# Patient Record
Sex: FEMALE | Race: WHITE | NOT HISPANIC OR LATINO | Employment: OTHER | URBAN - METROPOLITAN AREA
[De-identification: names, ages, dates, MRNs, and addresses within clinical notes are randomized per-mention and may not be internally consistent; named-entity substitution may affect disease eponyms.]

---

## 2017-06-22 ENCOUNTER — HISTORICAL (OUTPATIENT)
Dept: RADIOLOGY | Facility: HOSPITAL | Age: 47
End: 2017-06-22

## 2017-10-11 LAB — POC BETA-HCG (QUAL): NEGATIVE

## 2017-10-31 ENCOUNTER — HISTORICAL (OUTPATIENT)
Dept: RADIOLOGY | Facility: HOSPITAL | Age: 47
End: 2017-10-31

## 2017-11-14 ENCOUNTER — HISTORICAL (OUTPATIENT)
Dept: ADMINISTRATIVE | Facility: HOSPITAL | Age: 47
End: 2017-11-14

## 2018-05-30 ENCOUNTER — HISTORICAL (OUTPATIENT)
Dept: ADMINISTRATIVE | Facility: HOSPITAL | Age: 48
End: 2018-05-30

## 2018-05-30 LAB
ABS NEUT (OLG): 3.18 X10(3)/MCL (ref 2.1–9.2)
BASOPHILS # BLD AUTO: 0.1 X10(3)/MCL (ref 0–0.2)
BASOPHILS NFR BLD AUTO: 1 %
BUN SERPL-MCNC: 20 MG/DL (ref 7–18)
CALCIUM SERPL-MCNC: 9.3 MG/DL (ref 8.5–10.1)
CHLORIDE SERPL-SCNC: 108 MMOL/L (ref 98–107)
CO2 SERPL-SCNC: 23 MMOL/L (ref 21–32)
CREAT SERPL-MCNC: 1.06 MG/DL (ref 0.55–1.02)
CREAT/UREA NIT SERPL: 18.9
EOSINOPHIL # BLD AUTO: 0.1 X10(3)/MCL (ref 0–0.9)
EOSINOPHIL NFR BLD AUTO: 2 %
ERYTHROCYTE [DISTWIDTH] IN BLOOD BY AUTOMATED COUNT: 14.9 % (ref 11.5–17)
GLUCOSE SERPL-MCNC: 136 MG/DL (ref 74–106)
HCT VFR BLD AUTO: 39.5 % (ref 37–47)
HGB BLD-MCNC: 12.3 GM/DL (ref 12–16)
LYMPHOCYTES # BLD AUTO: 1.5 X10(3)/MCL (ref 0.6–4.6)
LYMPHOCYTES NFR BLD AUTO: 28 %
MCH RBC QN AUTO: 28.1 PG (ref 27–31)
MCHC RBC AUTO-ENTMCNC: 31.1 GM/DL (ref 33–36)
MCV RBC AUTO: 90.4 FL (ref 80–94)
MONOCYTES # BLD AUTO: 0.4 X10(3)/MCL (ref 0.1–1.3)
MONOCYTES NFR BLD AUTO: 8 %
NEUTROPHILS # BLD AUTO: 3.18 X10(3)/MCL (ref 1.4–7.9)
NEUTROPHILS NFR BLD AUTO: 60 %
PLATELET # BLD AUTO: 277 X10(3)/MCL (ref 130–400)
PMV BLD AUTO: 11.4 FL (ref 9.4–12.4)
POTASSIUM SERPL-SCNC: 4.2 MMOL/L (ref 3.5–5.1)
RBC # BLD AUTO: 4.37 X10(6)/MCL (ref 4.2–5.4)
SODIUM SERPL-SCNC: 140 MMOL/L (ref 136–145)
WBC # SPEC AUTO: 5.3 X10(3)/MCL (ref 4.5–11.5)

## 2018-12-10 ENCOUNTER — HISTORICAL (OUTPATIENT)
Dept: ADMINISTRATIVE | Facility: HOSPITAL | Age: 48
End: 2018-12-10

## 2019-09-02 ENCOUNTER — HOSPITAL ENCOUNTER (OUTPATIENT)
Dept: MEDSURG UNIT | Facility: HOSPITAL | Age: 49
End: 2019-09-04
Attending: INTERNAL MEDICINE | Admitting: INTERNAL MEDICINE

## 2019-09-03 LAB
ABS NEUT (OLG): 5.85 X10(3)/MCL (ref 2.1–9.2)
ALBUMIN SERPL-MCNC: 3.7 GM/DL (ref 3.4–5)
ALBUMIN/GLOB SERPL: 1.1 {RATIO}
ALP SERPL-CCNC: 100 UNIT/L (ref 38–126)
ALT SERPL-CCNC: 22 UNIT/L (ref 12–78)
APPEARANCE, UA: CLEAR
AST SERPL-CCNC: 17 UNIT/L (ref 15–37)
BACTERIA SPEC CULT: ABNORMAL /HPF
BASOPHILS # BLD AUTO: 0.1 X10(3)/MCL (ref 0–0.2)
BASOPHILS NFR BLD AUTO: 1 %
BILIRUB SERPL-MCNC: 0.3 MG/DL (ref 0.2–1)
BILIRUB UR QL STRIP: NEGATIVE
BILIRUBIN DIRECT+TOT PNL SERPL-MCNC: 0.1 MG/DL (ref 0–0.2)
BILIRUBIN DIRECT+TOT PNL SERPL-MCNC: 0.2 MG/DL (ref 0–0.8)
BUN SERPL-MCNC: 15 MG/DL (ref 7–18)
CALCIUM SERPL-MCNC: 10 MG/DL (ref 8.5–10.1)
CHLORIDE SERPL-SCNC: 107 MMOL/L (ref 98–107)
CO2 SERPL-SCNC: 28 MMOL/L (ref 21–32)
COLOR UR: YELLOW
CREAT SERPL-MCNC: 1.41 MG/DL (ref 0.55–1.02)
EOSINOPHIL # BLD AUTO: 0.1 X10(3)/MCL (ref 0–0.9)
EOSINOPHIL NFR BLD AUTO: 1 %
ERYTHROCYTE [DISTWIDTH] IN BLOOD BY AUTOMATED COUNT: 12.6 % (ref 11.5–17)
GLOBULIN SER-MCNC: 3.4 GM/DL (ref 2.4–3.5)
GLUCOSE (UA): NEGATIVE
GLUCOSE SERPL-MCNC: 98 MG/DL (ref 74–106)
HCT VFR BLD AUTO: 41.9 % (ref 37–47)
HGB BLD-MCNC: 13.1 GM/DL (ref 12–16)
HGB UR QL STRIP: NEGATIVE
KETONES UR QL STRIP: NEGATIVE
LEUKOCYTE ESTERASE UR QL STRIP: NEGATIVE
LYMPHOCYTES # BLD AUTO: 1.6 X10(3)/MCL (ref 0.6–4.6)
LYMPHOCYTES NFR BLD AUTO: 19 %
MCH RBC QN AUTO: 29.7 PG (ref 27–31)
MCHC RBC AUTO-ENTMCNC: 31.3 GM/DL (ref 33–36)
MCV RBC AUTO: 95 FL (ref 80–94)
MONOCYTES # BLD AUTO: 0.8 X10(3)/MCL (ref 0.1–1.3)
MONOCYTES NFR BLD AUTO: 9 %
NEUTROPHILS # BLD AUTO: 5.85 X10(3)/MCL (ref 2.1–9.2)
NEUTROPHILS NFR BLD AUTO: 69 %
NITRITE UR QL STRIP: NEGATIVE
PH UR STRIP: 6.5 [PH] (ref 5–9)
PLATELET # BLD AUTO: 243 X10(3)/MCL (ref 130–400)
PMV BLD AUTO: 10.7 FL (ref 9.4–12.4)
POC TROPONIN: 0.01 NG/ML (ref 0–0.08)
POTASSIUM SERPL-SCNC: 4.1 MMOL/L (ref 3.5–5.1)
PROT SERPL-MCNC: 7.1 GM/DL (ref 6.4–8.2)
PROT UR QL STRIP: NEGATIVE
RBC # BLD AUTO: 4.41 X10(6)/MCL (ref 4.2–5.4)
RBC #/AREA URNS HPF: ABNORMAL /[HPF]
SODIUM SERPL-SCNC: 140 MMOL/L (ref 136–145)
SP GR UR STRIP: 1.03 (ref 1–1.03)
SQUAMOUS EPITHELIAL, UA: ABNORMAL
TROPONIN I SERPL-MCNC: 0.02 NG/ML (ref 0.02–0.49)
TROPONIN I SERPL-MCNC: <0.02 NG/ML (ref 0.02–0.49)
TROPONIN I SERPL-MCNC: <0.02 NG/ML (ref 0.02–0.49)
UROBILINOGEN UR STRIP-ACNC: 0.2
WBC # SPEC AUTO: 8.5 X10(3)/MCL (ref 4.5–11.5)
WBC #/AREA URNS HPF: ABNORMAL /[HPF]

## 2019-09-04 LAB
(HCYS)2 SERPL-MCNC: 5.7 MCMOL/L (ref 3.2–10.7)
ABS NEUT (OLG): 2.41 X10(3)/MCL (ref 2.1–9.2)
AT III ACT/NOR PPP CHRO: 95 % (ref 82–139)
BASOPHILS # BLD AUTO: 0.1 X10(3)/MCL (ref 0–0.2)
BASOPHILS NFR BLD AUTO: 1 %
BUN SERPL-MCNC: 14 MG/DL (ref 7–18)
CALCIUM SERPL-MCNC: 8.9 MG/DL (ref 8.5–10.1)
CHLORIDE SERPL-SCNC: 106 MMOL/L (ref 98–107)
CO2 SERPL-SCNC: 30 MMOL/L (ref 21–32)
CREAT SERPL-MCNC: 0.85 MG/DL (ref 0.55–1.02)
CREAT/UREA NIT SERPL: 16.5
EOSINOPHIL # BLD AUTO: 0.2 X10(3)/MCL (ref 0–0.9)
EOSINOPHIL NFR BLD AUTO: 4 %
ERYTHROCYTE [DISTWIDTH] IN BLOOD BY AUTOMATED COUNT: 12.6 % (ref 11.5–17)
GLUCOSE SERPL-MCNC: 81 MG/DL (ref 74–106)
HCT VFR BLD AUTO: 38.3 % (ref 37–47)
HGB BLD-MCNC: 12 GM/DL (ref 12–16)
LYMPHOCYTES # BLD AUTO: 2 X10(3)/MCL (ref 0.6–4.6)
LYMPHOCYTES NFR BLD AUTO: 37 %
MCH RBC QN AUTO: 29.6 PG (ref 27–31)
MCHC RBC AUTO-ENTMCNC: 31.3 GM/DL (ref 33–36)
MCV RBC AUTO: 94.6 FL (ref 80–94)
MONOCYTES # BLD AUTO: 0.7 X10(3)/MCL (ref 0.1–1.3)
MONOCYTES NFR BLD AUTO: 13 %
NEUTROPHILS # BLD AUTO: 2.41 X10(3)/MCL (ref 2.1–9.2)
NEUTROPHILS NFR BLD AUTO: 44 %
PLATELET # BLD AUTO: 233 X10(3)/MCL (ref 130–400)
PMV BLD AUTO: 10.7 FL (ref 9.4–12.4)
POTASSIUM SERPL-SCNC: 4.3 MMOL/L (ref 3.5–5.1)
RBC # BLD AUTO: 4.05 X10(6)/MCL (ref 4.2–5.4)
SODIUM SERPL-SCNC: 142 MMOL/L (ref 136–145)
WBC # SPEC AUTO: 5.5 X10(3)/MCL (ref 4.5–11.5)

## 2019-09-05 LAB — FINAL CULTURE: NO GROWTH

## 2019-10-10 ENCOUNTER — HISTORICAL (OUTPATIENT)
Dept: CARDIOLOGY | Facility: HOSPITAL | Age: 49
End: 2019-10-10

## 2020-07-29 ENCOUNTER — HISTORICAL (OUTPATIENT)
Dept: LAB | Facility: HOSPITAL | Age: 50
End: 2020-07-29

## 2020-07-29 LAB
ABS NEUT (OLG): 3.47 X10(3)/MCL (ref 2.1–9.2)
BASOPHILS # BLD AUTO: 0.06 X10(3)/MCL (ref 0–0.2)
BASOPHILS NFR BLD AUTO: 1 % (ref 0–1)
DEPRECATED CALCIDIOL+CALCIFEROL SERPL-MC: 67.7 NG/ML (ref 6.6–49.9)
EOSINOPHIL # BLD AUTO: 0.09 X10(3)/MCL (ref 0–0.9)
EOSINOPHIL NFR BLD AUTO: 1.6 % (ref 0–6.4)
ERYTHROCYTE [DISTWIDTH] IN BLOOD BY AUTOMATED COUNT: 12.5 % (ref 11.5–17)
HCT VFR BLD AUTO: 43.3 % (ref 37–47)
HGB BLD-MCNC: 14.3 GM/DL (ref 12–16)
IMM GRANULOCYTES # BLD AUTO: 0.01 10*3/UL (ref 0–0.02)
IMM GRANULOCYTES NFR BLD AUTO: 0.2 % (ref 0–0.43)
LYMPHOCYTES # BLD AUTO: 1.54 X10(3)/MCL (ref 0.6–4.6)
LYMPHOCYTES NFR BLD AUTO: 26.6 % (ref 16–44)
MCH RBC QN AUTO: 30.2 PG (ref 27–31)
MCHC RBC AUTO-ENTMCNC: 33 GM/DL (ref 33–36)
MCV RBC AUTO: 91.5 FL (ref 80–94)
MONOCYTES # BLD AUTO: 0.63 X10(3)/MCL (ref 0.1–1.3)
MONOCYTES NFR BLD AUTO: 10.9 % (ref 4–12.1)
NEUTROPHILS # BLD AUTO: 3.47 X10(3)/MCL (ref 2.1–9.2)
NEUTROPHILS NFR BLD AUTO: 59.7 % (ref 43–73)
NRBC BLD AUTO-RTO: 0 % (ref 0–0.2)
PLATELET # BLD AUTO: 273 X10(3)/MCL (ref 130–400)
PMV BLD AUTO: 10.6 FL (ref 7.4–10.4)
RBC # BLD AUTO: 4.73 X10(6)/MCL (ref 4.2–5.4)
WBC # SPEC AUTO: 5.8 X10(3)/MCL (ref 4.5–11.5)

## 2020-11-10 ENCOUNTER — HISTORICAL (OUTPATIENT)
Dept: RADIOLOGY | Facility: HOSPITAL | Age: 50
End: 2020-11-10

## 2021-03-15 ENCOUNTER — HISTORICAL (OUTPATIENT)
Dept: CARDIOLOGY | Facility: HOSPITAL | Age: 51
End: 2021-03-15

## 2022-04-10 ENCOUNTER — HISTORICAL (OUTPATIENT)
Dept: ADMINISTRATIVE | Facility: HOSPITAL | Age: 52
End: 2022-04-10
Payer: MEDICARE

## 2022-04-26 VITALS
OXYGEN SATURATION: 97 % | DIASTOLIC BLOOD PRESSURE: 78 MMHG | WEIGHT: 256 LBS | SYSTOLIC BLOOD PRESSURE: 120 MMHG | BODY MASS INDEX: 41.14 KG/M2 | HEIGHT: 66 IN

## 2022-05-18 ENCOUNTER — OFFICE VISIT (OUTPATIENT)
Dept: URGENT CARE | Facility: CLINIC | Age: 52
End: 2022-05-18
Payer: MEDICARE

## 2022-05-18 VITALS
OXYGEN SATURATION: 96 % | DIASTOLIC BLOOD PRESSURE: 84 MMHG | TEMPERATURE: 98 F | WEIGHT: 243 LBS | SYSTOLIC BLOOD PRESSURE: 129 MMHG | HEIGHT: 67 IN | RESPIRATION RATE: 18 BRPM | BODY MASS INDEX: 38.14 KG/M2 | HEART RATE: 64 BPM

## 2022-05-18 DIAGNOSIS — M25.572 ACUTE LEFT ANKLE PAIN: ICD-10-CM

## 2022-05-18 DIAGNOSIS — M25.532 LEFT WRIST PAIN: ICD-10-CM

## 2022-05-18 DIAGNOSIS — M25.512 ACUTE PAIN OF LEFT SHOULDER: ICD-10-CM
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
## 2022-05-18 PROCEDURE — 3074F SYST BP LT 130 MM HG: CPT | Mod: CPTII,,, | Performed by: FAMILY MEDICINE

## 2022-05-18 PROCEDURE — 99214 PR OFFICE/OUTPT VISIT, EST, LEVL IV, 30-39 MIN: ICD-10-PCS | Mod: ,,, | Performed by: FAMILY MEDICINE

## 2022-05-18 PROCEDURE — 3079F PR MOST RECENT DIASTOLIC BLOOD PRESSURE 80-89 MM HG: ICD-10-PCS | Mod: CPTII,,, | Performed by: FAMILY MEDICINE

## 2022-05-18 PROCEDURE — 3008F PR BODY MASS INDEX (BMI) DOCUMENTED: ICD-10-PCS | Mod: CPTII,,, | Performed by: FAMILY MEDICINE

## 2022-05-18 PROCEDURE — 1159F PR MEDICATION LIST DOCUMENTED IN MEDICAL RECORD: ICD-10-PCS | Mod: CPTII,,, | Performed by: FAMILY MEDICINE

## 2022-05-18 PROCEDURE — 1160F PR REVIEW ALL MEDS BY PRESCRIBER/CLIN PHARMACIST DOCUMENTED: ICD-10-PCS | Mod: CPTII,,, | Performed by: FAMILY MEDICINE

## 2022-05-18 PROCEDURE — 1159F MED LIST DOCD IN RCRD: CPT | Mod: CPTII,,, | Performed by: FAMILY MEDICINE

## 2022-05-18 PROCEDURE — 3008F BODY MASS INDEX DOCD: CPT | Mod: CPTII,,, | Performed by: FAMILY MEDICINE

## 2022-05-18 PROCEDURE — 3074F PR MOST RECENT SYSTOLIC BLOOD PRESSURE < 130 MM HG: ICD-10-PCS | Mod: CPTII,,, | Performed by: FAMILY MEDICINE

## 2022-05-18 PROCEDURE — 1160F RVW MEDS BY RX/DR IN RCRD: CPT | Mod: CPTII,,, | Performed by: FAMILY MEDICINE

## 2022-05-18 PROCEDURE — 3079F DIAST BP 80-89 MM HG: CPT | Mod: CPTII,,, | Performed by: FAMILY MEDICINE

## 2022-05-18 PROCEDURE — 99214 OFFICE O/P EST MOD 30 MIN: CPT | Mod: ,,, | Performed by: FAMILY MEDICINE

## 2022-05-18 RX ORDER — FAMOTIDINE 20 MG/1
20 TABLET, FILM COATED ORAL 2 TIMES DAILY
COMMUNITY
Start: 2022-04-04

## 2022-05-18 RX ORDER — OXCARBAZEPINE 300 MG/1
300 TABLET, FILM COATED ORAL 2 TIMES DAILY
COMMUNITY
Start: 2022-05-09 | End: 2024-04-01

## 2022-05-18 RX ORDER — CLONAZEPAM 1 MG/1
1 TABLET ORAL 3 TIMES DAILY
COMMUNITY
Start: 2022-05-09 | End: 2024-04-01

## 2022-05-18 RX ORDER — CELECOXIB 100 MG/1
200 CAPSULE ORAL 2 TIMES DAILY
COMMUNITY
Start: 2021-07-03 | End: 2024-04-01

## 2022-05-18 RX ORDER — ATORVASTATIN CALCIUM 40 MG/1
40 TABLET, FILM COATED ORAL DAILY
COMMUNITY
Start: 2022-03-11

## 2022-05-18 RX ORDER — TRAMADOL HYDROCHLORIDE 50 MG/1
50 TABLET ORAL EVERY 12 HOURS PRN
Qty: 10 EACH | Refills: 0 | Status: SHIPPED | OUTPATIENT
Start: 2022-05-18 | End: 2022-05-23

## 2022-05-18 RX ORDER — TRAZODONE HYDROCHLORIDE 100 MG/1
200 TABLET ORAL NIGHTLY PRN
COMMUNITY
Start: 2022-05-09 | End: 2024-04-01

## 2022-05-18 RX ORDER — PANTOPRAZOLE SODIUM 40 MG/1
40 TABLET, DELAYED RELEASE ORAL DAILY
COMMUNITY
Start: 2022-03-07

## 2022-05-18 RX ORDER — LANOLIN ALCOHOL/MO/W.PET/CERES
1 CREAM (GRAM) TOPICAL
COMMUNITY
End: 2022-09-18

## 2022-05-18 RX ORDER — LANOLIN ALCOHOL/MO/W.PET/CERES
1000 CREAM (GRAM) TOPICAL DAILY
COMMUNITY
End: 2024-04-01

## 2022-05-18 RX ORDER — SUVOREXANT 10 MG/1
10 TABLET, FILM COATED ORAL DAILY
COMMUNITY
Start: 2022-05-10

## 2022-05-18 RX ORDER — LEVOMEFOLATE CALCIUM 15 MG
TABLET ORAL
COMMUNITY
End: 2022-09-18

## 2022-05-18 RX ORDER — ACETAMINOPHEN, DIPHENHYDRAMINE HCL, PHENYLEPHRINE HCL 325; 25; 5 MG/1; MG/1; MG/1
10 TABLET ORAL DAILY
COMMUNITY

## 2022-05-18 RX ORDER — BREXPIPRAZOLE 1 MG/1
1 TABLET ORAL DAILY
COMMUNITY
Start: 2022-05-09 | End: 2024-04-01

## 2022-05-18 RX ORDER — RIVAROXABAN 10 MG/1
10 TABLET, FILM COATED ORAL DAILY
COMMUNITY
Start: 2022-02-23

## 2022-05-18 RX ORDER — VORTIOXETINE 20 MG/1
20 TABLET, FILM COATED ORAL NIGHTLY
COMMUNITY
Start: 2022-05-09

## 2022-05-18 NOTE — PATIENT INSTRUCTIONS
Ice to warm compress, Tylenol for discomfort. Given ER precautions for continued or worsening HA, NV, or other signs of worsening since she is on Xarelto there is a higher risk for brain bleed. Voiced understanding.   Tramadol for severe pain.  Can cause sedation.  Do not take and drive.

## 2022-05-18 NOTE — PROGRESS NOTES
"Subjective:       Patient ID: Mini Chappell is a 52 y.o. female.    Vitals:  height is 5' 7" (1.702 m) and weight is 110.2 kg (243 lb). Her oral temperature is 97.9 °F (36.6 °C). Her blood pressure is 129/84 and her pulse is 64. Her respiration is 18 and oxygen saturation is 96%.     Chief Complaint: Fall (Fell off of a golf cart on 5-18-22 left leg from hip to ankle, left wrist and elbow, possibly head)    Fell off of a golf cart on 5-18-22 left leg from hip to ankle, left wrist and elbow, possibly head    Fall  The accident occurred 1 to 3 hours ago. Fall occurred: from golf cart. She fell from a height of 3 to 5 ft. She landed on concrete. The point of impact was the head, neck, left elbow, left wrist, left hip, left knee, left foot and buttocks. The pain is present in the left foot, buttocks and left wrist. The pain is at a severity of 9/10. The pain is severe. The symptoms are aggravated by ambulation and pressure on injury. Associated symptoms comments: Numbness in face. She has tried nothing for the symptoms. The treatment provided no relief.     ROS    Objective:      Physical Exam      Assessment:       No diagnosis found.      Plan:         There are no diagnoses linked to this encounter.               "

## 2022-05-18 NOTE — PROGRESS NOTES
"Subjective:       Patient ID: Mini Chappell is a 52 y.o. female.    Vitals:  height is 5' 7" (1.702 m) and weight is 110.2 kg (243 lb). Her oral temperature is 97.9 °F (36.6 °C). Her blood pressure is 129/84 and her pulse is 64. Her respiration is 18 and oxygen saturation is 96%.     Chief Complaint: Fall (Fell off of a golf cart on 5-18-22 left leg from hip to ankle, left wrist and elbow, possibly head)    HPI 51 yo F with hx of PE on Xarelto presents for fall today onto L side with L wrist, L elbow, LLE along with hip and slight L sided head pain. No severe HA, no NV. No LOC. Able to ambulate with mild pain.  Fell onto concrete while the golf cart was turning.  No numbness or tingling.     Constitution: Negative for fatigue, fever and generalized weakness.   HENT: Negative for tinnitus, hearing loss and sinus pain.    Cardiovascular: Negative for chest trauma.   Musculoskeletal: Positive for pain and trauma.   Skin: Positive for abrasion.   Neurological: Negative for dizziness and altered mental status.   Psychiatric/Behavioral: Negative for altered mental status.       Objective:      Physical Exam   Constitutional: She is oriented to person, place, and time. She appears well-developed. She is cooperative. No distress.   HENT:   Head: Normocephalic and atraumatic.   Nose: Nose normal.   Mouth/Throat: Oropharynx is clear and moist and mucous membranes are normal.   Eyes: Conjunctivae and lids are normal.   Neck: Trachea normal and phonation normal. Neck supple.   Cardiovascular: Normal rate, regular rhythm, normal heart sounds and normal pulses.   Pulmonary/Chest: Effort normal and breath sounds normal.   Abdominal: Normal appearance and bowel sounds are normal. She exhibits no abdominal bruit, no pulsatile midline mass and no mass. Soft.   Musculoskeletal:         General: No deformity.      Comments: Pos TTP of lateral and posterior L shoulder, FROM of L shoulder with pulling sensation, neg empty can; FROM of " LUE and LLE; pos TTP of medial L wrist and medial malleolus L ankle otherwise generalized ache throughout thought to be from fibromyalgia.    Neurological: She is alert and oriented to person, place, and time. She has normal strength and normal reflexes. No sensory deficit.   Skin: Skin is warm, dry, intact and not diaphoretic.         Comments: Small abrasion to L shoulder; no other acute lesions appreciated   Psychiatric: Her speech is normal and behavior is normal. Judgment and thought content normal.   Nursing note and vitals reviewed.        Assessment:       1. Fall, initial encounter    2. Acute pain of left shoulder    3. Left wrist pain    4. Acute left ankle pain          Plan:         Fall, initial encounter  -     XR SHOULDER COMPLETE 2 OR MORE VIEWS LEFT; Future; Expected date: 05/18/2022  -     XR WRIST COMPLETE 3 VIEWS LEFT; Future; Expected date: 05/18/2022  -     XR ANKLE COMPLETE 3 VIEW LEFT; Future; Expected date: 05/18/2022  -     traMADoL (ULTRAM) 50 mg tablet; Take 1 tablet (50 mg total) by mouth every 12 (twelve) hours as needed for Pain.  Dispense: 10 each; Refill: 0    Acute pain of left shoulder  -     traMADoL (ULTRAM) 50 mg tablet; Take 1 tablet (50 mg total) by mouth every 12 (twelve) hours as needed for Pain.  Dispense: 10 each; Refill: 0    Left wrist pain  -     traMADoL (ULTRAM) 50 mg tablet; Take 1 tablet (50 mg total) by mouth every 12 (twelve) hours as needed for Pain.  Dispense: 10 each; Refill: 0    Acute left ankle pain  -     traMADoL (ULTRAM) 50 mg tablet; Take 1 tablet (50 mg total) by mouth every 12 (twelve) hours as needed for Pain.  Dispense: 10 each; Refill: 0            X ray of the left shoulder done today, per my review no acute fractures or dislocations.  X ray of the left wrist done today, per my review no acute fractures or dislocations.  X ray of the left ankle done today, per my review no acute fractures or dislocations.    Final reports came in and pt notified  of negative results for fracture or dislocations.

## 2022-05-19 ENCOUNTER — TELEPHONE (OUTPATIENT)
Dept: URGENT CARE | Facility: CLINIC | Age: 52
End: 2022-05-19
Payer: MEDICARE

## 2022-05-19 NOTE — TELEPHONE ENCOUNTER
Talked with pt about final xray results and pt verbalized understanding     Tried calling pt with xray results- no answer left vm

## 2022-09-18 ENCOUNTER — OFFICE VISIT (OUTPATIENT)
Dept: URGENT CARE | Facility: CLINIC | Age: 52
End: 2022-09-18
Payer: MEDICARE

## 2022-09-18 VITALS
RESPIRATION RATE: 19 BRPM | WEIGHT: 243 LBS | HEART RATE: 73 BPM | BODY MASS INDEX: 38.14 KG/M2 | HEIGHT: 67 IN | TEMPERATURE: 98 F | SYSTOLIC BLOOD PRESSURE: 98 MMHG | DIASTOLIC BLOOD PRESSURE: 64 MMHG | OXYGEN SATURATION: 98 %

## 2022-09-18 DIAGNOSIS — R05.9 COUGH: ICD-10-CM

## 2022-09-18 DIAGNOSIS — R06.00 DYSPNEA, UNSPECIFIED TYPE: Primary | ICD-10-CM

## 2022-09-18 LAB
CTP QC/QA: YES
CTP QC/QA: YES
FLUAV AG NPH QL: NEGATIVE
FLUBV AG NPH QL: NEGATIVE
SARS-COV-2 RDRP RESP QL NAA+PROBE: NEGATIVE

## 2022-09-18 PROCEDURE — 3074F PR MOST RECENT SYSTOLIC BLOOD PRESSURE < 130 MM HG: ICD-10-PCS | Mod: CPTII,,, | Performed by: FAMILY MEDICINE

## 2022-09-18 PROCEDURE — 3078F DIAST BP <80 MM HG: CPT | Mod: CPTII,,, | Performed by: FAMILY MEDICINE

## 2022-09-18 PROCEDURE — 1159F PR MEDICATION LIST DOCUMENTED IN MEDICAL RECORD: ICD-10-PCS | Mod: CPTII,,, | Performed by: FAMILY MEDICINE

## 2022-09-18 PROCEDURE — 3078F PR MOST RECENT DIASTOLIC BLOOD PRESSURE < 80 MM HG: ICD-10-PCS | Mod: CPTII,,, | Performed by: FAMILY MEDICINE

## 2022-09-18 PROCEDURE — 99213 OFFICE O/P EST LOW 20 MIN: CPT | Mod: 25,,, | Performed by: FAMILY MEDICINE

## 2022-09-18 PROCEDURE — 3008F PR BODY MASS INDEX (BMI) DOCUMENTED: ICD-10-PCS | Mod: CPTII,,, | Performed by: FAMILY MEDICINE

## 2022-09-18 PROCEDURE — 3074F SYST BP LT 130 MM HG: CPT | Mod: CPTII,,, | Performed by: FAMILY MEDICINE

## 2022-09-18 PROCEDURE — 87804 POCT INFLUENZA A/B: ICD-10-PCS | Mod: 59,QW,, | Performed by: FAMILY MEDICINE

## 2022-09-18 PROCEDURE — 1159F MED LIST DOCD IN RCRD: CPT | Mod: CPTII,,, | Performed by: FAMILY MEDICINE

## 2022-09-18 PROCEDURE — 1160F PR REVIEW ALL MEDS BY PRESCRIBER/CLIN PHARMACIST DOCUMENTED: ICD-10-PCS | Mod: CPTII,,, | Performed by: FAMILY MEDICINE

## 2022-09-18 PROCEDURE — 1160F RVW MEDS BY RX/DR IN RCRD: CPT | Mod: CPTII,,, | Performed by: FAMILY MEDICINE

## 2022-09-18 PROCEDURE — 99213 PR OFFICE/OUTPT VISIT, EST, LEVL III, 20-29 MIN: ICD-10-PCS | Mod: 25,,, | Performed by: FAMILY MEDICINE

## 2022-09-18 PROCEDURE — 3008F BODY MASS INDEX DOCD: CPT | Mod: CPTII,,, | Performed by: FAMILY MEDICINE

## 2022-09-18 PROCEDURE — 87804 INFLUENZA ASSAY W/OPTIC: CPT | Mod: QW,,, | Performed by: FAMILY MEDICINE

## 2022-09-18 PROCEDURE — U0002 COVID-19 LAB TEST NON-CDC: HCPCS | Mod: QW,,, | Performed by: FAMILY MEDICINE

## 2022-09-18 PROCEDURE — U0002: ICD-10-PCS | Mod: QW,,, | Performed by: FAMILY MEDICINE

## 2022-09-18 RX ORDER — TOLTERODINE TARTRATE 2 MG/1
4 TABLET, EXTENDED RELEASE ORAL DAILY
COMMUNITY
End: 2024-04-01

## 2022-09-18 RX ORDER — ALBUTEROL SULFATE 90 UG/1
2 AEROSOL, METERED RESPIRATORY (INHALATION) EVERY 6 HOURS PRN
COMMUNITY
End: 2023-01-30 | Stop reason: SDUPTHER

## 2022-09-18 RX ORDER — NYSTATIN 100000 U/G
CREAM TOPICAL
COMMUNITY

## 2022-09-18 RX ORDER — TRIAMCINOLONE ACETONIDE 1 MG/G
CREAM TOPICAL ONCE AS NEEDED
COMMUNITY

## 2022-09-18 RX ORDER — DESONIDE 0.5 MG/G
CREAM TOPICAL 2 TIMES DAILY
COMMUNITY
End: 2024-04-01

## 2022-09-18 RX ORDER — TIZANIDINE 4 MG/1
4 TABLET ORAL DAILY
COMMUNITY

## 2022-09-18 RX ORDER — IPRATROPIUM BROMIDE 42 UG/1
2 SPRAY, METERED NASAL 4 TIMES DAILY
COMMUNITY

## 2022-09-18 RX ORDER — LORATADINE 10 MG/1
10 TABLET ORAL DAILY
COMMUNITY
End: 2024-04-01

## 2022-09-18 RX ORDER — VALACYCLOVIR HYDROCHLORIDE 500 MG/1
500 TABLET, FILM COATED ORAL 2 TIMES DAILY
COMMUNITY

## 2022-09-18 RX ORDER — MUPIROCIN 20 MG/G
OINTMENT TOPICAL
COMMUNITY

## 2022-09-18 RX ORDER — LISDEXAMFETAMINE DIMESYLATE 70 MG/1
70 CAPSULE ORAL EVERY MORNING
COMMUNITY
End: 2024-04-01

## 2022-09-18 RX ORDER — ACETAMINOPHEN AND CODEINE PHOSPHATE 300; 30 MG/1; MG/1
1 TABLET ORAL EVERY 6 HOURS PRN
COMMUNITY
End: 2024-02-16 | Stop reason: SDUPTHER

## 2022-09-18 RX ORDER — LEVOMEFOLATE CALCIUM 15 MG
15 TABLET ORAL DAILY
COMMUNITY

## 2022-09-18 RX ORDER — CYCLOSPORINE 0.5 MG/ML
1 EMULSION OPHTHALMIC 2 TIMES DAILY
COMMUNITY

## 2022-09-18 NOTE — PROGRESS NOTES
"Subjective:       Patient ID: Mini hCappell is a 52 y.o. female.    Vitals:  height is 5' 7" (1.702 m) and weight is 110.2 kg (243 lb). Her temperature is 98.3 °F (36.8 °C). Her blood pressure is 98/64 and her pulse is 73. Her respiration is 19 and oxygen saturation is 98%.     Chief Complaint: Cough (Frequent bowel movements 1 week, cough, sweats, runny nose, dizziness, body ache since Thursday, diarrhea today)    HPI:  52-year-old female known for multiple medical conditions follows up with primary MD .  Present to clinic with concerns of frequent bowel movement since 1 week, patient also complains of coughing, congestion, body aches since 4 days.  No vomiting, no nausea.  No fever.  Denies eating anything different or unusual.  No bladder symptoms like burning sensation urgency or ear frequency.  No concerns of positive exposure to infections.  States not well since 1 week, most time in the bed for last 4 days.  Noticing short of breath and chest discomfort.  Appears concerned with history of PE in the past.  States 2 episodes of pulmonary embolism in the past.    ROS  :  Constitutional : Negative for fever, Negative for weakness  HEENT : No sore throat,No earache  Neck : Negative except HPI  Respiratory :  Occasional shortness of breath, chest discomfort  Cardiovascular : Negative for chest pain, no palpitations   Gastrointestinal : Negative except as documented in HPI  Integumentary : Negative for skin rash  Neurological : Negative except HPI   Objective:      Physical Exam    General : Alert and Oriented, No apparent distress, afebrile, clear speech, appropriate communication  Neck - supple  HENT : Oropharynx no redness or swelling.   Respiratory : Bilateral equal breath sounds, nonlabored respirations  Cardiovascular : Rate, rhythm regular, normal volume pulse, no murmur  Gastrointestinal: Full abdomen, soft, nontender to palpate  Integumentary : Warm, Dry and no rash    Assessment:       1. " Dyspnea, unspecified type    2. Cough       Plan:     With new onset dyspnea and chest discomfort, with history of pulmonary embolism in the past, encouraged to go to ER directly from the clinic for further evaluation.  Patient voiced understanding.  Accompanied by Mom.  Desires to go in personal vehicle to [facility].  COVID-19 test negative, flu test negative  Dyspnea, unspecified type    Cough  -     POCT COVID-19 Rapid Screening  -     POCT Influenza A/B

## 2022-09-18 NOTE — PATIENT INSTRUCTIONS
With new onset dyspnea and chest discomfort, with history of pulmonary embolism in the past, encouraged to go to ER directly from the clinic for further evaluation.  Patient voiced understanding.  Accompanied by Mom.  Desires to go in personal vehicle to heart occipital.  COVID-19 test negative, flu test negative

## 2022-09-21 ENCOUNTER — HISTORICAL (OUTPATIENT)
Dept: ADMINISTRATIVE | Facility: HOSPITAL | Age: 52
End: 2022-09-21
Payer: MEDICARE

## 2022-09-23 ENCOUNTER — OFFICE VISIT (OUTPATIENT)
Dept: URGENT CARE | Facility: CLINIC | Age: 52
End: 2022-09-23
Payer: MEDICARE

## 2022-09-23 VITALS
TEMPERATURE: 99 F | HEIGHT: 67 IN | RESPIRATION RATE: 20 BRPM | BODY MASS INDEX: 37.83 KG/M2 | OXYGEN SATURATION: 99 % | WEIGHT: 241 LBS | DIASTOLIC BLOOD PRESSURE: 77 MMHG | SYSTOLIC BLOOD PRESSURE: 111 MMHG | HEART RATE: 107 BPM

## 2022-09-23 DIAGNOSIS — L98.9 SKIN LESIONS, GENERALIZED: Primary | ICD-10-CM

## 2022-09-23 DIAGNOSIS — R42 VERTIGO: ICD-10-CM

## 2022-09-23 PROCEDURE — 3078F DIAST BP <80 MM HG: CPT | Mod: CPTII,,, | Performed by: FAMILY MEDICINE

## 2022-09-23 PROCEDURE — 1160F RVW MEDS BY RX/DR IN RCRD: CPT | Mod: CPTII,,, | Performed by: FAMILY MEDICINE

## 2022-09-23 PROCEDURE — 3008F BODY MASS INDEX DOCD: CPT | Mod: CPTII,,, | Performed by: FAMILY MEDICINE

## 2022-09-23 PROCEDURE — 1159F MED LIST DOCD IN RCRD: CPT | Mod: CPTII,,, | Performed by: FAMILY MEDICINE

## 2022-09-23 PROCEDURE — 3008F PR BODY MASS INDEX (BMI) DOCUMENTED: ICD-10-PCS | Mod: CPTII,,, | Performed by: FAMILY MEDICINE

## 2022-09-23 PROCEDURE — 3078F PR MOST RECENT DIASTOLIC BLOOD PRESSURE < 80 MM HG: ICD-10-PCS | Mod: CPTII,,, | Performed by: FAMILY MEDICINE

## 2022-09-23 PROCEDURE — 1159F PR MEDICATION LIST DOCUMENTED IN MEDICAL RECORD: ICD-10-PCS | Mod: CPTII,,, | Performed by: FAMILY MEDICINE

## 2022-09-23 PROCEDURE — 1160F PR REVIEW ALL MEDS BY PRESCRIBER/CLIN PHARMACIST DOCUMENTED: ICD-10-PCS | Mod: CPTII,,, | Performed by: FAMILY MEDICINE

## 2022-09-23 PROCEDURE — 3074F PR MOST RECENT SYSTOLIC BLOOD PRESSURE < 130 MM HG: ICD-10-PCS | Mod: CPTII,,, | Performed by: FAMILY MEDICINE

## 2022-09-23 PROCEDURE — 3074F SYST BP LT 130 MM HG: CPT | Mod: CPTII,,, | Performed by: FAMILY MEDICINE

## 2022-09-23 PROCEDURE — 99214 OFFICE O/P EST MOD 30 MIN: CPT | Mod: ,,, | Performed by: FAMILY MEDICINE

## 2022-09-23 PROCEDURE — 99214 PR OFFICE/OUTPT VISIT, EST, LEVL IV, 30-39 MIN: ICD-10-PCS | Mod: ,,, | Performed by: FAMILY MEDICINE

## 2022-09-23 RX ORDER — CEPHALEXIN 500 MG/1
500 CAPSULE ORAL EVERY 12 HOURS
Qty: 14 CAPSULE | Refills: 0 | Status: SHIPPED | OUTPATIENT
Start: 2022-09-23 | End: 2022-09-30

## 2022-09-23 NOTE — PATIENT INSTRUCTIONS
Cautious with lighting and walking support as well as good supportive shoes while getting further workup for vertigo.  Keflex to help with red lesions.

## 2022-09-23 NOTE — PROGRESS NOTES
"Subjective:       Patient ID: Mini Chappell is a 52 y.o. female.    Vitals:  height is 5' 7" (1.702 m) and weight is 109.3 kg (241 lb). Her oral temperature is 98.5 °F (36.9 °C). Her blood pressure is 111/77 and her pulse is 107. Her respiration is 20 and oxygen saturation is 99%.     Chief Complaint: Dizziness (Dizziness and knee pain for two weeks. Skin irritation for three weeks. Evaluated in clinic for same symptoms on 9/18/22. )    HPI  ROS    Objective:      Physical Exam      Assessment:       No diagnosis found.      Plan:         There are no diagnoses linked to this encounter.                 "

## 2022-09-23 NOTE — PROGRESS NOTES
"Subjective:       Patient ID: Mini Chappell is a 52 y.o. female.    Vitals:  height is 5' 7" (1.702 m) and weight is 109.3 kg (241 lb). Her oral temperature is 98.5 °F (36.9 °C). Her blood pressure is 111/77 and her pulse is 107. Her respiration is 20 and oxygen saturation is 99%.     Chief Complaint: Dizziness (Dizziness and knee pain for two weeks. Skin irritation for three weeks. Evaluated in clinic for same symptoms on 9/18/22. )    Pt seen twice for same symptoms.  EKG, CXR, examinations non concerning at that time.  Presents for dizziness that is being worked up for POTS by Cardiologist. Was planning to get workup by ENT but needed to stop many current medications.  Main concern today is lesions of B shoulders, back and BUE that she continues to itch at night time.  No fever.  Also upset about vertigo symptoms a falling over feeling when standing, worse with low lights.  Not sleeping well but on multiple sleep aids.     Constitution: Positive for sweating and fatigue. Negative for fever.   Cardiovascular:  Negative for chest pain and palpitations.   Eyes:  Negative for eye pain.   Genitourinary:  Negative for flank pain.   Skin:  Negative for rash.   Neurological:  Positive for dizziness and history of vertigo. Negative for passing out.     Objective:      Physical Exam   Constitutional: She is oriented to person, place, and time. No distress.   HENT:   Head: Normocephalic.   Nose: Nose normal.   Mouth/Throat: Mucous membranes are moist.   Cardiovascular: Normal rate and regular rhythm.   Pulmonary/Chest: Effort normal and breath sounds normal.   Neurological: She is alert and oriented to person, place, and time.      Comments: Neg pronator drift.    Skin: No bruising         Comments: Multiple erythematous excoriated lesions to the shoulder, arms and back.  Many with surrounding erythema.  jaundice  Psychiatric:      Comments: Crying, anxious   Nursing note and vitals reviewed.      Assessment:       1. Skin " lesions, generalized    2. Vertigo          Plan:         Skin lesions, generalized  -     cephALEXin (KEFLEX) 500 MG capsule; Take 1 capsule (500 mg total) by mouth every 12 (twelve) hours. for 7 days  Dispense: 14 capsule; Refill: 0    Vertigo

## 2023-01-17 ENCOUNTER — HOSPITAL ENCOUNTER (EMERGENCY)
Facility: HOSPITAL | Age: 53
Discharge: LEFT BEFORE DISCHARGE | End: 2023-01-17
Payer: MEDICARE

## 2023-01-17 VITALS
RESPIRATION RATE: 20 BRPM | SYSTOLIC BLOOD PRESSURE: 115 MMHG | TEMPERATURE: 98 F | HEART RATE: 79 BPM | OXYGEN SATURATION: 93 % | DIASTOLIC BLOOD PRESSURE: 81 MMHG

## 2023-01-17 DIAGNOSIS — R06.02 SOB (SHORTNESS OF BREATH): ICD-10-CM

## 2023-01-17 DIAGNOSIS — R06.02 SHORTNESS OF BREATH: ICD-10-CM

## 2023-01-17 LAB
ALBUMIN SERPL-MCNC: 3.7 G/DL (ref 3.5–5)
ALBUMIN/GLOB SERPL: 1.2 RATIO (ref 1.1–2)
ALP SERPL-CCNC: 76 UNIT/L (ref 40–150)
ALT SERPL-CCNC: 14 UNIT/L (ref 0–55)
APPEARANCE UR: ABNORMAL
AST SERPL-CCNC: 16 UNIT/L (ref 5–34)
BACTERIA #/AREA URNS AUTO: ABNORMAL /HPF
BASOPHILS # BLD AUTO: 0.06 X10(3)/MCL (ref 0–0.2)
BASOPHILS NFR BLD AUTO: 0.7 %
BILIRUB UR QL STRIP.AUTO: NEGATIVE MG/DL
BILIRUBIN DIRECT+TOT PNL SERPL-MCNC: 0.6 MG/DL
BUN SERPL-MCNC: 14.9 MG/DL (ref 9.8–20.1)
CALCIUM SERPL-MCNC: 9.5 MG/DL (ref 8.4–10.2)
CHLORIDE SERPL-SCNC: 107 MMOL/L (ref 98–107)
CO2 SERPL-SCNC: 23 MMOL/L (ref 22–29)
COLOR UR AUTO: ABNORMAL
CREAT SERPL-MCNC: 1.06 MG/DL (ref 0.55–1.02)
EOSINOPHIL # BLD AUTO: 0.01 X10(3)/MCL (ref 0–0.9)
EOSINOPHIL NFR BLD AUTO: 0.1 %
ERYTHROCYTE [DISTWIDTH] IN BLOOD BY AUTOMATED COUNT: 12.1 % (ref 11.5–17)
FLUAV AG UPPER RESP QL IA.RAPID: NOT DETECTED
FLUBV AG UPPER RESP QL IA.RAPID: NOT DETECTED
GFR SERPLBLD CREATININE-BSD FMLA CKD-EPI: >60 MLS/MIN/1.73/M2
GLOBULIN SER-MCNC: 3 GM/DL (ref 2.4–3.5)
GLUCOSE SERPL-MCNC: 124 MG/DL (ref 74–100)
GLUCOSE UR QL STRIP.AUTO: NEGATIVE MG/DL
HCT VFR BLD AUTO: 38.6 % (ref 37–47)
HGB BLD-MCNC: 12.4 GM/DL (ref 12–16)
IMM GRANULOCYTES # BLD AUTO: 0.03 X10(3)/MCL (ref 0–0.04)
IMM GRANULOCYTES NFR BLD AUTO: 0.3 %
KETONES UR QL STRIP.AUTO: ABNORMAL MG/DL
LEUKOCYTE ESTERASE UR QL STRIP.AUTO: ABNORMAL UNIT/L
LYMPHOCYTES # BLD AUTO: 0.74 X10(3)/MCL (ref 0.6–4.6)
LYMPHOCYTES NFR BLD AUTO: 8.3 %
MCH RBC QN AUTO: 30.2 PG
MCHC RBC AUTO-ENTMCNC: 32.1 MG/DL (ref 33–36)
MCV RBC AUTO: 93.9 FL (ref 80–94)
MONOCYTES # BLD AUTO: 0.74 X10(3)/MCL (ref 0.1–1.3)
MONOCYTES NFR BLD AUTO: 8.3 %
NEUTROPHILS # BLD AUTO: 7.37 X10(3)/MCL (ref 2.1–9.2)
NEUTROPHILS NFR BLD AUTO: 82.3 %
NITRITE UR QL STRIP.AUTO: NEGATIVE
NRBC BLD AUTO-RTO: 0 %
PH UR STRIP.AUTO: 5.5 [PH]
PLATELET # BLD AUTO: 242 X10(3)/MCL (ref 130–400)
PMV BLD AUTO: 9.9 FL (ref 7.4–10.4)
POTASSIUM SERPL-SCNC: 3.7 MMOL/L (ref 3.5–5.1)
PROT SERPL-MCNC: 6.7 GM/DL (ref 6.4–8.3)
PROT UR QL STRIP.AUTO: ABNORMAL MG/DL
RBC # BLD AUTO: 4.11 X10(6)/MCL (ref 4.2–5.4)
RBC #/AREA URNS AUTO: 25 /HPF
RBC UR QL AUTO: ABNORMAL UNIT/L
SARS-COV-2 RNA RESP QL NAA+PROBE: NOT DETECTED
SODIUM SERPL-SCNC: 138 MMOL/L (ref 136–145)
SP GR UR STRIP.AUTO: 1.02 (ref 1–1.03)
SQUAMOUS #/AREA URNS AUTO: 11 /HPF
TROPONIN I SERPL-MCNC: <0.01 NG/ML (ref 0–0.04)
UROBILINOGEN UR STRIP-ACNC: 0.2 MG/DL
WBC # SPEC AUTO: 9 X10(3)/MCL (ref 4.5–11.5)
WBC #/AREA URNS AUTO: 72 /HPF

## 2023-01-17 PROCEDURE — 81001 URINALYSIS AUTO W/SCOPE: CPT | Performed by: PHYSICIAN ASSISTANT

## 2023-01-17 PROCEDURE — 99285 EMERGENCY DEPT VISIT HI MDM: CPT | Mod: 25,CS

## 2023-01-17 PROCEDURE — 93010 ELECTROCARDIOGRAM REPORT: CPT | Mod: ,,, | Performed by: INTERNAL MEDICINE

## 2023-01-17 PROCEDURE — 0240U COVID/FLU A&B PCR: CPT | Performed by: PHYSICIAN ASSISTANT

## 2023-01-17 PROCEDURE — 93010 EKG 12-LEAD: ICD-10-PCS | Mod: ,,, | Performed by: INTERNAL MEDICINE

## 2023-01-17 PROCEDURE — 80053 COMPREHEN METABOLIC PANEL: CPT | Performed by: PHYSICIAN ASSISTANT

## 2023-01-17 PROCEDURE — 87088 URINE BACTERIA CULTURE: CPT | Performed by: PHYSICIAN ASSISTANT

## 2023-01-17 PROCEDURE — 93005 ELECTROCARDIOGRAM TRACING: CPT

## 2023-01-17 PROCEDURE — 84484 ASSAY OF TROPONIN QUANT: CPT | Performed by: PHYSICIAN ASSISTANT

## 2023-01-17 PROCEDURE — 85025 COMPLETE CBC W/AUTO DIFF WBC: CPT | Performed by: PHYSICIAN ASSISTANT

## 2023-01-17 NOTE — FIRST PROVIDER EVALUATION
"Medical screening examination initiated.  I have conducted a focused provider triage encounter, findings are as follows:    Chief Complaint   Patient presents with    Shortness of Breath     Pt to ER via POV for SOB and CP.  Started several hrs ago.  Hx PE 2018 takes xarelto. Pt reports "body pain", hx fibromyalgia       Brief history of present illness:  52 y.o. female presents to the E.D. with c/o abrupt shortness of breath. Patient has pmhx of PE, on xarelto. Last diagnosed PE was in 2018. Patient reports body pain all over.     Vitals:    01/17/23 1301   BP: 115/81   Pulse: 79   Resp: 20   Temp: 98.1 °F (36.7 °C)   TempSrc: Oral       Pertinent physical exam:  Awake, Alert, Oriented, Answers questions appropriately    Brief workup plan:  labs, ekg, imaging     Preliminary workup initiated; this workup will be continued and followed by the physician or advanced practice provider that is assigned to the patient when roomed.  "

## 2023-01-18 LAB
BACTERIA UR CULT: ABNORMAL

## 2023-01-27 ENCOUNTER — APPOINTMENT (OUTPATIENT)
Dept: LAB | Facility: HOSPITAL | Age: 53
End: 2023-01-27
Payer: MEDICARE

## 2023-02-22 ENCOUNTER — HOSPITAL ENCOUNTER (EMERGENCY)
Facility: HOSPITAL | Age: 53
Discharge: HOME | End: 2023-02-22
Attending: EMERGENCY MEDICINE
Payer: MEDICARE

## 2023-02-22 VITALS
BODY MASS INDEX: 39.24 KG/M2 | HEIGHT: 67 IN | DIASTOLIC BLOOD PRESSURE: 65 MMHG | WEIGHT: 250 LBS | HEART RATE: 84 BPM | RESPIRATION RATE: 18 BRPM | SYSTOLIC BLOOD PRESSURE: 111 MMHG | OXYGEN SATURATION: 98 % | TEMPERATURE: 98 F

## 2023-02-22 DIAGNOSIS — M51.36 DDD (DEGENERATIVE DISC DISEASE), LUMBAR: Primary | ICD-10-CM

## 2023-02-22 PROBLEM — G89.29 CHRONIC BILATERAL LOW BACK PAIN WITHOUT SCIATICA: Status: ACTIVE | Noted: 2019-03-07

## 2023-02-22 PROBLEM — F41.9 ANXIETY: Status: ACTIVE | Noted: 2023-02-22

## 2023-02-22 PROBLEM — M19.90 ARTHRITIS: Status: ACTIVE | Noted: 2023-02-22

## 2023-02-22 PROBLEM — K21.9 GASTROESOPHAGEAL REFLUX DISEASE: Status: ACTIVE | Noted: 2023-02-22

## 2023-02-22 PROBLEM — F32.A DEPRESSION: Status: ACTIVE | Noted: 2023-02-22

## 2023-02-22 PROBLEM — E78.5 HYPERLIPIDEMIA: Status: ACTIVE | Noted: 2023-02-22

## 2023-02-22 PROBLEM — F43.10 PTSD (POST-TRAUMATIC STRESS DISORDER): Status: ACTIVE | Noted: 2021-09-14

## 2023-02-22 PROBLEM — M54.50 CHRONIC BILATERAL LOW BACK PAIN WITHOUT SCIATICA: Status: ACTIVE | Noted: 2019-03-07

## 2023-02-22 PROBLEM — G43.909 MIGRAINE HEADACHE: Status: ACTIVE | Noted: 2023-02-22

## 2023-02-22 PROBLEM — M79.7 FIBROMYALGIA: Status: ACTIVE | Noted: 2017-12-15

## 2023-02-22 PROCEDURE — 25000003 PHARM REV CODE 250

## 2023-02-22 PROCEDURE — 99283 EMERGENCY DEPT VISIT LOW MDM: CPT

## 2023-02-22 RX ORDER — HYDROCODONE BITARTRATE AND ACETAMINOPHEN 7.5; 325 MG/1; MG/1
1 TABLET ORAL
Status: COMPLETED | OUTPATIENT
Start: 2023-02-22 | End: 2023-02-22

## 2023-02-22 RX ORDER — HYDROCODONE BITARTRATE AND ACETAMINOPHEN 7.5; 325 MG/1; MG/1
1 TABLET ORAL EVERY 6 HOURS PRN
Qty: 12 TABLET | Refills: 0 | Status: SHIPPED | OUTPATIENT
Start: 2023-02-22 | End: 2024-02-16 | Stop reason: SDUPTHER

## 2023-02-22 RX ADMIN — HYDROCODONE BITARTRATE AND ACETAMINOPHEN 1 TABLET: 7.5; 325 TABLET ORAL at 02:02

## 2023-02-22 NOTE — ED TRIAGE NOTES
Pt complaint of worsening pain to lower back especially at waking in the morning and bilateral knees pain with a jerking motion at walking. Pt PCP is Dr Sanchez relating injections to lower back approx a month ago with no improvement. Pt ambulatory with a cane

## 2023-02-22 NOTE — ED PROVIDER NOTES
Encounter Date: 2/22/2023       History     Chief Complaint   Patient presents with    Back Pain     Pt complaint of worsening pain to lower back especially at waking in the morning and bilateral knees pain with a jerking motion at walking. Pt PCP is Dr Sanchez relating injections to lower back approx a month ago with no improvement     The patient is a 53 y.o. female with a history of fibromyalgia who presents to the Emergency Department with a chief complaint of lower back pain. Symptoms began 1 month ago and have been constant since onset. Her pain is currently rated as a 8/10 in severity and described as aching with no radiation. Associated symptoms include bilateral knee pain. Symptoms are aggravated with movement and ambulation and there are no alleviating factors. The patient denies numbness or tingling to extremities, saddle anesthesia, loss of bowel or bladder. She reports taking nothing prior to arrival with no relief of symptoms. Patient states that she had injection in her back a few weeks ago with mild improvement on pain. No other reported symptoms at this time.      The history is provided by the patient. No  was used.   Back Pain   This is a new problem. The current episode started several days ago. The problem occurs daily. The problem has been waxing and waning. The pain is associated with no known injury. The pain is present in the lumbar spine. The quality of the pain is described as aching. The pain does not radiate. The pain is at a severity of 8/10. The pain is The same all the time. Pertinent negatives include no chest pain, no fever, no numbness, no headaches, no abdominal pain, no dysuria, no paresthesias, no paresis, no tingling and no weakness. She has tried nothing for the symptoms. The treatment provided no relief.   Review of patient's allergies indicates:   Allergen Reactions    Erythromycin base Diarrhea and Nausea And Vomiting     Stomach cramps    Macrolide  antibiotics Diarrhea and Rash     Other reaction(s): diarrhea/vomiting  Makes her sick   Makes her sick        Past Medical History:   Diagnosis Date    Anxiety     Bipolar affective     Chronic fatigue     Depression     Fibromyalgia     GERD (gastroesophageal reflux disease)     Migraines     Pulmonary embolism     Sleep apnea, unspecified     Unspecified osteoarthritis, unspecified site      Past Surgical History:   Procedure Laterality Date    DILATION AND CURETTAGE OF UTERUS      gastric sleeve      Heart monitor loop      HYSTERECTOMY      TUBAL LIGATION       Family History   Adopted: Yes     Social History     Tobacco Use    Smoking status: Former     Packs/day: 0.50     Years: 12.00     Pack years: 6.00     Types: Cigarettes     Quit date: 11/3/2020     Years since quittin.3    Smokeless tobacco: Never    Tobacco comments:     Patient smokes medical marijuana   Substance Use Topics    Alcohol use: Not Currently    Drug use: Yes     Types: Marijuana     Comment: medical     Review of Systems   Constitutional:  Negative for fever.   HENT:  Negative for sore throat.    Respiratory:  Negative for shortness of breath.    Cardiovascular:  Negative for chest pain.   Gastrointestinal:  Negative for abdominal pain, nausea and vomiting.   Genitourinary:  Negative for dysuria, frequency and urgency.   Musculoskeletal:  Positive for arthralgias and back pain.   Skin:  Negative for rash.   Neurological:  Negative for dizziness, tingling, weakness, numbness, headaches and paresthesias.   Hematological:  Does not bruise/bleed easily.   Psychiatric/Behavioral:  Negative for behavioral problems.    All other systems reviewed and are negative.    Physical Exam     Initial Vitals [23 1411]   BP Pulse Resp Temp SpO2   107/73 73 18 98 °F (36.7 °C) 97 %      MAP       --         Physical Exam    Nursing note and vitals reviewed.  Constitutional: She appears well-developed and well-nourished.   HENT:   Head:  Normocephalic.   Right Ear: Hearing and tympanic membrane normal.   Left Ear: Hearing and tympanic membrane normal.   Mouth/Throat: Uvula is midline, oropharynx is clear and moist and mucous membranes are normal.   Cardiovascular:  Normal rate, regular rhythm, normal heart sounds and normal pulses.           Pulmonary/Chest: Effort normal and breath sounds normal.   Abdominal: Abdomen is soft. Bowel sounds are normal. There is no abdominal tenderness.   Musculoskeletal:      Cervical back: Normal.      Thoracic back: Normal.      Lumbar back: Tenderness present. Normal range of motion.      Right knee: Tenderness present. Normal pulse.      Left knee: Tenderness present. Normal pulse.     Lymphadenopathy:     She has no cervical adenopathy.   Neurological: She is alert. GCS eye subscore is 4. GCS verbal subscore is 5. GCS motor subscore is 6.   Skin: Skin is warm, dry and intact. Capillary refill takes less than 2 seconds.       ED Course   Procedures  Labs Reviewed - No data to display       Imaging Results              X-Ray Lumbar Spine 2 Or 3 Views (Final result)  Result time 02/22/23 15:00:35      Final result by Christine Gipson MD (02/22/23 15:00:35)                   Impression:      1. No acute abnormality identified.  2. Multilevel degenerative changes of the lumbar spine, progressed from the prior exam.      Electronically signed by: Christine Gipson  Date:    02/22/2023  Time:    15:00               Narrative:    EXAMINATION:  XR LUMBAR SPINE 2 OR 3 VIEWS    CLINICAL HISTORY:  lower back pain;    COMPARISON:  X-rays dated 06/05/2015    FINDINGS:  There are 5 non-rib-bearing lumbar type vertebral bodies.  There is grade 1 anterolisthesis of L4 over L5.  Alignment is otherwise preserved.  Vertebral heights are maintained.  There is mild disc height loss at L1-L2 with small marginal osteophytes.  There is moderate facet arthropathy in the lower lumbar spine.  Minimal scattered vascular calcifications are  noted.                                       Medications   HYDROcodone-acetaminophen 7.5-325 mg per tablet 1 tablet (1 tablet Oral Given 2/22/23 6868)     Medical Decision Making:   Initial Assessment:   The patient is a 53 y.o. female with a history of fibromyalgia who presents to the Emergency Department with a chief complaint of lower back pain. Symptoms began 1 month ago and have been constant since onset. Her pain is currently rated as a 8/10 in severity and described as aching with no radiation. Associated symptoms include bilateral knee pain. Symptoms are aggravated with movement and ambulation and there are no alleviating factors. The patient denies numbness or tingling to extremities, saddle anesthesia, loss of bowel or bladder. She reports taking nothing prior to arrival with no relief of symptoms. Patient states that she had injection in her back a few weeks ago with mild improvement on pain. No other reported symptoms at this time.  Differential Diagnosis:   DDD, lumbar strain, lumbar radiculopathy   Clinical Tests:   Radiological Study: Ordered and Reviewed  ED Management:  MDM  History obtained by patient  Co-morbidities and/or factors adding to the complexity or risk for the patient?: chronic pain  Differential diagnoses: DDD, lumbar strain, lumbar radiculopathy   Decision to obtain previous or outside records?: no  Chart Review (nursing home, outside records, CareEverywhere): no  Review of RX medications/new RX prescribed by me?: norco  My EKG Interpretation: see above  Labs/imaging/other tests obtained/considered (risk/benefits of testing discussed): xr lumbar spine  Labs/tests intepretation: multilevel degenerative changes of lumbar spine  My independent imaging interpretation: none  Treatment/interventions, IV fluids, IV medications: norco  Complex management (IV controlled substances, went to OR, DNR, meds requiring monitoring, transfer, etc)?: none  Workup/treatment affected by social  determinants of health?: none   Point of care US done/interpretation: none  Consults/radiologist/EMS/social work/family discussion/alternate history: none  Advanced care planning/end of life discussion: none  Shared decision making: shared decision making with patient.  ETOH/smoking/drug cessation discussion: discussed with patient.   Dispo: discharge home            ED Course as of 02/22/23 1532   Wed Feb 22, 2023   1515 Patient reports significant improvement in her pain after norco. She is moving all extremities. She has no numbness or tingling to extremities. She has no red flag neurological symptoms. Discussed results with patient and encouraged her to follow up with her pcp. She is amendable to plan and ready for dc home.  [LM]      ED Course User Index  [LM] Michelle Erwin NP                 Clinical Impression:   Final diagnoses:  [M51.36] DDD (degenerative disc disease), lumbar (Primary)        ED Disposition Condition    Discharge Stable          ED Prescriptions       Medication Sig Dispense Start Date End Date Auth. Provider    HYDROcodone-acetaminophen (NORCO) 7.5-325 mg per tablet Take 1 tablet by mouth every 6 (six) hours as needed for Pain. 12 tablet 2/22/2023 -- Michelle Erwin NP          Follow-up Information       Follow up With Specialties Details Why Contact Info    Guadalupe Ta MD Family Medicine Schedule an appointment as soon as possible for a visit  As needed, If symptoms worsen [id number] [facility] [address]  [phone]               Michelle Erwin NP  02/22/23 3726

## 2023-03-06 DIAGNOSIS — M54.42 CHRONIC LOW BACK PAIN WITH BILATERAL SCIATICA, UNSPECIFIED BACK PAIN LATERALITY: Primary | ICD-10-CM

## 2023-03-06 DIAGNOSIS — G89.29 CHRONIC LOW BACK PAIN WITH BILATERAL SCIATICA, UNSPECIFIED BACK PAIN LATERALITY: Primary | ICD-10-CM

## 2023-03-06 DIAGNOSIS — M54.41 CHRONIC LOW BACK PAIN WITH BILATERAL SCIATICA, UNSPECIFIED BACK PAIN LATERALITY: Primary | ICD-10-CM

## 2023-03-13 ENCOUNTER — TELEPHONE (OUTPATIENT)
Dept: NEUROSURGERY | Facility: CLINIC | Age: 53
End: 2023-03-13
Payer: MEDICARE

## 2023-03-13 NOTE — TELEPHONE ENCOUNTER
Received referral. Spoke with patient. She states she does have an MRI of her back scheduled for 3/17/23 at [facility]. I tentatively scheduled her with Dr. Sanchez for  5/9/23 at 1:00. Patient has not had any recent testing since Xray in 2/2023. Denies seeing any other doctors for this. Has not had any surgeries on her back/neck. Denies any conservative treatment at this time. I did advise her that I will send myself a reminder to F/U on MRI results and will be in touch with her. She verbalized understanding.

## 2023-03-20 NOTE — TELEPHONE ENCOUNTER
Pulled MRI report/images. I do not see anything alarming on the MRI report that patient should come in earlier, will keep the apt with Dr. Sanchez as is.

## 2023-04-03 ENCOUNTER — HOSPITAL ENCOUNTER (OUTPATIENT)
Dept: RADIOLOGY | Facility: HOSPITAL | Age: 53
Discharge: HOME | End: 2023-04-03
Payer: MEDICARE

## 2023-04-03 DIAGNOSIS — Z86.711 HX OF PULMONARY EMBOLUS: ICD-10-CM

## 2023-04-03 DIAGNOSIS — J18.9 PNEUMONIA DUE TO INFECTIOUS ORGANISM, UNSPECIFIED LATERALITY, UNSPECIFIED PART OF LUNG: ICD-10-CM

## 2023-04-03 PROCEDURE — 25500020 PHARM REV CODE 255

## 2023-04-03 PROCEDURE — 71260 CT THORAX DX C+: CPT | Mod: TC

## 2023-04-03 RX ADMIN — IOPAMIDOL 100 ML: 755 INJECTION, SOLUTION INTRAVENOUS at 09:04

## 2023-05-25 NOTE — TELEPHONE ENCOUNTER
The patients new patient appointment was canceled due to Dr. Sanchez having a lot of hospital call and traumas.  Since her appointment we have changed some of our scheduling guidelines.  Dr. Sanchez is not accepting any new spine patients with Medicare or Medicare advantage under the age of 65.  I spoke to Zuly with Dr. Ta's office to make her aware and they will refer her elsewhere.  BH

## 2023-09-11 ENCOUNTER — HOSPITAL ENCOUNTER (OUTPATIENT)
Dept: RADIOLOGY | Facility: CLINIC | Age: 53
Discharge: HOME | End: 2023-09-11
Attending: REHABILITATION UNIT
Payer: MEDICARE

## 2023-09-11 ENCOUNTER — OFFICE VISIT (OUTPATIENT)
Dept: ORTHOPEDICS | Facility: CLINIC | Age: 53
End: 2023-09-11
Payer: MEDICARE

## 2023-09-11 DIAGNOSIS — M54.50 CHRONIC BILATERAL LOW BACK PAIN WITHOUT SCIATICA: ICD-10-CM

## 2023-09-11 DIAGNOSIS — M25.512 BILATERAL SHOULDER PAIN, UNSPECIFIED CHRONICITY: ICD-10-CM

## 2023-09-11 DIAGNOSIS — M25.512 BILATERAL SHOULDER PAIN, UNSPECIFIED CHRONICITY: Primary | ICD-10-CM

## 2023-09-11 DIAGNOSIS — M25.511 BILATERAL SHOULDER PAIN, UNSPECIFIED CHRONICITY: Primary | ICD-10-CM

## 2023-09-11 DIAGNOSIS — G89.29 CHRONIC BILATERAL LOW BACK PAIN WITHOUT SCIATICA: ICD-10-CM

## 2023-09-11 DIAGNOSIS — M75.80 ROTATOR CUFF TENDINITIS, UNSPECIFIED LATERALITY: ICD-10-CM

## 2023-09-11 DIAGNOSIS — M25.511 BILATERAL SHOULDER PAIN, UNSPECIFIED CHRONICITY: ICD-10-CM

## 2023-09-11 PROCEDURE — 73030 X-RAY EXAM OF SHOULDER: CPT | Mod: RT,,, | Performed by: REHABILITATION UNIT

## 2023-09-11 PROCEDURE — 20610 LARGE JOINT ASPIRATION/INJECTION: BILATERAL SUBACROMIAL BURSA: ICD-10-PCS | Mod: 50,,, | Performed by: REHABILITATION UNIT

## 2023-09-11 PROCEDURE — 73030 XR SHOULDER COMPLETE 2 OR MORE VIEWS RIGHT: ICD-10-PCS | Mod: RT,,, | Performed by: REHABILITATION UNIT

## 2023-09-11 PROCEDURE — 99203 OFFICE O/P NEW LOW 30 MIN: CPT | Mod: 25,,, | Performed by: REHABILITATION UNIT

## 2023-09-11 PROCEDURE — 73030 X-RAY EXAM OF SHOULDER: CPT | Mod: LT,,, | Performed by: REHABILITATION UNIT

## 2023-09-11 PROCEDURE — 99203 PR OFFICE/OUTPT VISIT, NEW, LEVL III, 30-44 MIN: ICD-10-PCS | Mod: 25,,, | Performed by: REHABILITATION UNIT

## 2023-09-11 PROCEDURE — 20610 DRAIN/INJ JOINT/BURSA W/O US: CPT | Mod: 50,,, | Performed by: REHABILITATION UNIT

## 2023-09-11 RX ADMIN — BETAMETHASONE SODIUM PHOSPHATE AND BETAMETHASONE ACETATE 6 MG: 3; 3 INJECTION, SUSPENSION INTRA-ARTICULAR; INTRALESIONAL; INTRAMUSCULAR; SOFT TISSUE at 01:09

## 2023-09-11 RX ADMIN — LIDOCAINE HYDROCHLORIDE 60 MG: 20 INJECTION, SOLUTION INFILTRATION; PERINEURAL at 01:09

## 2023-09-11 NOTE — PROGRESS NOTES
Subjective:      Patient ID: Mini Chappell is a 53 y.o. female.    Chief Complaint: Shoulder Pain (RHD. patient is having pain FREMIN sholders. Rt. is worse than Lt. ongoing a few months ago. saw. Dr. Escalera at [facility] before over a year and had injection and it worked well. some injections work and some dont from other Dr.)    HPI:   Mini Chappell is a 53 y.o. female who presents today for initial evaluation of her bilateral shoulders. RHD. R>L.  Longstanding bilateral shoulder pain.  It is associated with loss of motion and strength.  She has seen another provider and was given steroid injection.  She reports that this was very helpful at providing some good but temporary relief.  Ultimately her pain returned.  She has known carpal tunnel syndrome to her bilateral upper extremities.  She has pain that radiates diffusely about the shoulder.  Her pain is maximally to the posterior aspect of her right shoulder.  She does have some occasional pain extends distally into the upper arm, elbow, and forearm.  She has tried physical therapy in the past.  She reports that therapy was not beneficial.  She has tried several medications with no relief.    Past Medical History:   Diagnosis Date    Anxiety     Bipolar affective     Chronic fatigue     Depression     Fibromyalgia     GERD (gastroesophageal reflux disease)     Migraines     Pulmonary embolism     Sleep apnea, unspecified     Unspecified osteoarthritis, unspecified site      Past Surgical History:   Procedure Laterality Date    DILATION AND CURETTAGE OF UTERUS      gastric sleeve      Heart monitor loop      HYSTERECTOMY      TUBAL LIGATION       Social History     Socioeconomic History    Marital status:    Tobacco Use    Smoking status: Former     Current packs/day: 0.00     Average packs/day: 0.5 packs/day for 12.0 years (6.0 ttl pk-yrs)     Types: Cigarettes     Start date: 11/3/2008     Quit date: 11/3/2020     Years since quittin.8    Smokeless  tobacco: Never    Tobacco comments:     Patient smokes medical marijuana   Substance and Sexual Activity    Alcohol use: Not Currently    Drug use: Yes     Types: Marijuana     Comment: medical         Current Outpatient Medications:     acetaminophen-codeine 300-30mg (TYLENOL #3) 300-30 mg Tab, Take 1 tablet by mouth every 6 (six) hours as needed., Disp: , Rfl:     albuterol (PROVENTIL/VENTOLIN HFA) 90 mcg/actuation inhaler, Inhale 2 puffs into the lungs every 6 (six) hours as needed for Wheezing. Rescue, Disp: 18 g, Rfl: 11    albuterol (PROVENTIL/VENTOLIN HFA) 90 mcg/actuation inhaler, Inhale into the lungs., Disp: , Rfl:     atorvastatin (LIPITOR) 40 MG tablet, Take 40 mg by mouth once daily., Disp: , Rfl:     BELSOMRA 10 mg Tab, Take 10 mg by mouth Daily., Disp: , Rfl:     calcium citrate/vitamin D3 (CITRACAL + D ORAL), Take 630 mg by mouth 2 (two) times a day., Disp: , Rfl:     celecoxib (CELEBREX) 100 MG capsule, Take 200 mg by mouth 2 (two) times daily., Disp: , Rfl:     cetirizine (ZYRTEC) 10 mg Cap, Take by mouth., Disp: , Rfl:     clonazePAM (KLONOPIN) 1 MG tablet, Take 1 mg by mouth 3 (three) times daily., Disp: , Rfl:     COQ10, UBIQUINOL, ORAL, Take 100 mg by mouth Daily., Disp: , Rfl:     cyanocobalamin (VITAMIN B-12) 1000 MCG tablet, Take 1,000 mcg by mouth once daily., Disp: , Rfl:     cycloSPORINE (RESTASIS) 0.05 % ophthalmic emulsion, 1 drop 2 (two) times daily., Disp: , Rfl:     desonide (DESOWEN) 0.05 % cream, Apply topically 2 (two) times daily., Disp: , Rfl:     famotidine (PEPCID) 20 MG tablet, Take 20 mg by mouth 2 (two) times daily., Disp: , Rfl:     guaifenesin/dextromethorphan (ROBITUSSIN-COUGH-CHEST-TIERNEY ORAL), Take by mouth., Disp: , Rfl:     HYDROcodone-acetaminophen (NORCO) 7.5-325 mg per tablet, Take 1 tablet by mouth every 6 (six) hours as needed for Pain., Disp: 12 tablet, Rfl: 0    ipratropium (ATROVENT) 42 mcg (0.06 %) nasal spray, 2 sprays by Nasal route 4 (four) times daily.,  Disp: , Rfl:     levomefolate calcium (ELFOLATE) 15 mg Tab, Take 15 mg by mouth Daily., Disp: , Rfl:     lisdexamfetamine (VYVANSE) 70 MG capsule, Take 70 mg by mouth every morning., Disp: , Rfl:     loratadine (CLARITIN) 10 mg tablet, Take 10 mg by mouth once daily., Disp: , Rfl:     melatonin 10 mg Tab, Take 10 mg by mouth Daily., Disp: , Rfl:     mupirocin (BACTROBAN) 2 % ointment, Apply topically., Disp: , Rfl:     mv,calcium,min/iron/folic/vitK (ONE-A-DAY WOMEN'S COMPLETE ORAL), Take by mouth Daily., Disp: , Rfl:     nystatin (MYCOSTATIN) cream, Apply topically., Disp: , Rfl:     OXcarbazepine (TRILEPTAL) 300 MG Tab, Take 300 mg by mouth 2 (two) times daily., Disp: , Rfl:     pantoprazole (PROTONIX) 40 MG tablet, Take 40 mg by mouth once daily., Disp: , Rfl:     REXULTI 1 mg Tab, Take 1 mg by mouth Daily., Disp: , Rfl:     tiZANidine (ZANAFLEX) 4 MG tablet, Take 4 mg by mouth Daily., Disp: , Rfl:     tolterodine (DETROL) 2 MG Tab, Take 4 mg by mouth Daily., Disp: , Rfl:     traZODone (DESYREL) 100 MG tablet, Take 200 mg by mouth nightly as needed., Disp: , Rfl:     triamcinolone acetonide 0.1% (KENALOG) 0.1 % cream, Apply topically once as needed., Disp: , Rfl:     TRINTELLIX 20 mg Tab, Take 20 mg by mouth nightly., Disp: , Rfl:     UNABLE TO FIND, Inhale into the lungs every 6 (six) hours. medication name: Prescription Cannabis, Disp: , Rfl:     UNABLE TO FIND, medication name: [place] Vitamin D3 1 capsule 50,000intl units oral daily, Disp: , Rfl:     valACYclovir (VALTREX) 500 MG tablet, Take 500 mg by mouth 2 (two) times daily. 1 tab po bid for 3 days prn, Disp: , Rfl:     XARELTO 10 mg Tab, Take 10 mg by mouth Daily., Disp: , Rfl:   Review of patient's allergies indicates:   Allergen Reactions    Erythromycin base Diarrhea and Nausea And Vomiting     Stomach cramps    Macrolide antibiotics Diarrhea and Rash     Other reaction(s): diarrhea/vomiting  Makes her sick   Makes her sick          There were no  vitals taken for this visit.    Comprehensive review of systems completed and negative except as per HPI.        Objective:   Head: Normocephalic, without obvious abnormality, atraumatic  Eyes: conjunctivae/corneas clear. EOM's intact  Ears: normal external appearance  Nose: Nares normal. Septum midline. Mucosa normal. No drainage  Throat: normal findings: lips normal without lesions  Lungs: unlabored breathing on room air  Chest wall: symmetric chest rise  Heart: regular rate and rhythm  Pulses: 2+ and symmetric  Skin: Skin color, texture, turgor normal. No rashes or lesions  Neurologic: Grossly normal    bilateral SHOULDER    Appearance:   normal    Cervical Spine:   No ttp; full, painless ROM; negative Spurlings    Tenderness:     Diffusely about the right shoulder with no significant discrete tenderness to the left    AROM:   R , Abduction 150, ER 80, IR side pocket  L , Abduction 160, ER 80, IR T10    PROM:  same    Pain:  AROM: Positive  PROM:  Positive  End ROM: Positive  Supraspinatus strength testing: Positive  External rotation strength testing: Positive  Loraine-scapular: Positive  Virtually all provacative maneuvers Positive    Strength:  Supraspinatus: intact  External rotation: intact  Loraine-scapular: intact    Provocative Maneuvers:     Rotator Cuff/Biceps/AC Joint  Neer's Sign: Positive  Hawkin's Test: Positive  Painful arc: Positive  Belly Press: Negative  Bear Hugger Test: Negative  Hornblower's Sign: Negative  Speed's Test: Positive  Yergeson's Test: Positive  Cross Arm Abduction: Positive    Pulses: Palpable radial pulse    Neurological deficits: None    The patient has a warm and well-perfused upper extremity with capillary refill less than 2 seconds. Sensation is intact to light touch in terminal nerve distributions. 5/5 ain/pin/uln. The patient has no palpable epitrochlear lymphadenopathy.      Assessment:     Imaging:   Four view radiographs of the right shoulder obtained today  personally reviewed showing no acute fractures or dislocations.  Joint spaces are well maintained. Moderate AC arthritis.  Humeral head remains seated centrally within the glenoid.    Four view radiographs of the left shoulder obtained today personally reviewed showing no acute fractures or dislocations.  Joint spaces are well maintained. Moderate AC arthritis. Humeral head remains seated centrally within the glenoid.    Large Joint Aspiration/Injection: bilateral subacromial bursa    Date/Time: 9/11/2023 1:00 PM    Performed by: Emmanuel Escalera MD  Authorized by: Emmanuel Escalera MD    Consent Done?:  Yes (Verbal)  Indications:  Pain  Site marked: the procedure site was marked    Timeout: prior to procedure the correct patient, procedure, and site was verified    Prep: patient was prepped and draped in usual sterile fashion    Local anesthesia used?: No      Details:  Needle Size:  22 G  Ultrasonic Guidance for needle placement?: No    Approach:  Posterior  Location:  Shoulder  Laterality:  Bilateral  Site:  Bilateral subacromial bursa  Medications (Right):  60 mg LIDOcaine HCL 20 mg/ml (2%) 20 mg/mL (2 %); 6 mg betamethasone acetate-betamethasone sodium phosphate 6 mg/mL  Medications (Left):  60 mg LIDOcaine HCL 20 mg/ml (2%) 20 mg/mL (2 %); 6 mg betamethasone acetate-betamethasone sodium phosphate 6 mg/mL  Patient tolerance:  Patient tolerated the procedure well with no immediate complications          1. Bilateral shoulder pain, unspecified chronicity    2. Rotator cuff tendinitis, unspecified laterality          Plan:       Orders Placed This Encounter    X-ray Shoulder 2 or More Views Right    X-Ray Shoulder 2 or More Views Left     Imaging and exam findings discussed with the patient.  She has bilateral shoulder pain.  She has good motion and strength overall with no significant bony abnormality on plain radiographs outside of some AC arthritis.  Findings consistent with bursitis/cuff tendinitis.   Bilateral subacromial steroid injections were provided as above.  Continue symptomatic treatment with rest, ice, and anti-inflammatories as medically able.  She will follow up with me as needed.  If her pain persists we will proceed with MRI evaluation.  All of her questions were answered and she was in agreement with the plan.

## 2023-09-26 ENCOUNTER — OFFICE VISIT (OUTPATIENT)
Dept: ORTHOPEDICS | Facility: CLINIC | Age: 53
End: 2023-09-26
Payer: MEDICARE

## 2023-09-26 ENCOUNTER — HOSPITAL ENCOUNTER (OUTPATIENT)
Dept: RADIOLOGY | Facility: CLINIC | Age: 53
Discharge: HOME | End: 2023-09-26
Attending: SPECIALIST
Payer: MEDICARE

## 2023-09-26 ENCOUNTER — PATIENT MESSAGE (OUTPATIENT)
Dept: ORTHOPEDICS | Facility: CLINIC | Age: 53
End: 2023-09-26

## 2023-09-26 VITALS
HEIGHT: 67 IN | BODY MASS INDEX: 39.81 KG/M2 | SYSTOLIC BLOOD PRESSURE: 106 MMHG | HEART RATE: 63 BPM | WEIGHT: 253.63 LBS | DIASTOLIC BLOOD PRESSURE: 73 MMHG

## 2023-09-26 DIAGNOSIS — M25.552 BILATERAL HIP PAIN: ICD-10-CM

## 2023-09-26 DIAGNOSIS — M25.551 BILATERAL HIP PAIN: ICD-10-CM

## 2023-09-26 DIAGNOSIS — M25.552 BILATERAL HIP PAIN: Primary | ICD-10-CM

## 2023-09-26 DIAGNOSIS — M25.551 BILATERAL HIP PAIN: Primary | ICD-10-CM

## 2023-09-26 PROCEDURE — 3074F SYST BP LT 130 MM HG: CPT | Mod: CPTII,,, | Performed by: SPECIALIST

## 2023-09-26 PROCEDURE — 3078F DIAST BP <80 MM HG: CPT | Mod: CPTII,,, | Performed by: SPECIALIST

## 2023-09-26 PROCEDURE — 3008F PR BODY MASS INDEX (BMI) DOCUMENTED: ICD-10-PCS | Mod: CPTII,,, | Performed by: SPECIALIST

## 2023-09-26 PROCEDURE — 99213 OFFICE O/P EST LOW 20 MIN: CPT | Mod: ,,, | Performed by: SPECIALIST

## 2023-09-26 PROCEDURE — 3008F BODY MASS INDEX DOCD: CPT | Mod: CPTII,,, | Performed by: SPECIALIST

## 2023-09-26 PROCEDURE — 73521 X-RAY EXAM HIPS BI 2 VIEWS: CPT | Mod: ,,, | Performed by: SPECIALIST

## 2023-09-26 PROCEDURE — 1159F MED LIST DOCD IN RCRD: CPT | Mod: CPTII,,, | Performed by: SPECIALIST

## 2023-09-26 PROCEDURE — 3078F PR MOST RECENT DIASTOLIC BLOOD PRESSURE < 80 MM HG: ICD-10-PCS | Mod: CPTII,,, | Performed by: SPECIALIST

## 2023-09-26 PROCEDURE — 73521 XR HIPS BILATERAL 2 VIEW INCL AP PELVIS: ICD-10-PCS | Mod: ,,, | Performed by: SPECIALIST

## 2023-09-26 PROCEDURE — 1160F RVW MEDS BY RX/DR IN RCRD: CPT | Mod: CPTII,,, | Performed by: SPECIALIST

## 2023-09-26 PROCEDURE — 1159F PR MEDICATION LIST DOCUMENTED IN MEDICAL RECORD: ICD-10-PCS | Mod: CPTII,,, | Performed by: SPECIALIST

## 2023-09-26 PROCEDURE — 3074F PR MOST RECENT SYSTOLIC BLOOD PRESSURE < 130 MM HG: ICD-10-PCS | Mod: CPTII,,, | Performed by: SPECIALIST

## 2023-09-26 PROCEDURE — 99213 PR OFFICE/OUTPT VISIT, EST, LEVL III, 20-29 MIN: ICD-10-PCS | Mod: ,,, | Performed by: SPECIALIST

## 2023-09-26 PROCEDURE — 1160F PR REVIEW ALL MEDS BY PRESCRIBER/CLIN PHARMACIST DOCUMENTED: ICD-10-PCS | Mod: CPTII,,, | Performed by: SPECIALIST

## 2023-09-26 NOTE — PROGRESS NOTES
Chief Complaint:   Chief Complaint   Patient presents with    Hip Pain     Leroy hip pain ongoing for years. Pain has not allowed patient to sleep/walk. Each day her pain varies. States it radiates to her back and to the groin. She is seeing a back specialist 11/2023.          History of present illness:    This is a 53 y.o. year old female who complains of bilateral lateral hip and buttock pain  She has had pain for a few years  Had a workup of her lumbar spine and has an appt with Dr Mcdermott in a few weeks  Here for hip eval      Past Medical History:   Diagnosis Date    Anxiety     Bipolar affective     Chronic fatigue     Depression     Fibromyalgia     GERD (gastroesophageal reflux disease)     Migraines     Pulmonary embolism     Sleep apnea, unspecified     Unspecified osteoarthritis, unspecified site        Past Surgical History:   Procedure Laterality Date    DILATION AND CURETTAGE OF UTERUS      gastric sleeve      Heart monitor loop      HYSTERECTOMY      TUBAL LIGATION         Current Outpatient Medications   Medication Instructions    acetaminophen-codeine 300-30mg (TYLENOL #3) 300-30 mg Tab 1 tablet, Oral, Every 6 hours PRN    albuterol (PROVENTIL/VENTOLIN HFA) 90 mcg/actuation inhaler 2 puffs, Inhalation, Every 6 hours PRN, Rescue    albuterol (PROVENTIL/VENTOLIN HFA) 90 mcg/actuation inhaler Inhalation    atorvastatin (LIPITOR) 40 mg, Oral, Daily    BELSOMRA 10 mg, Oral, Daily    calcium citrate/vitamin D3 (CITRACAL + D ORAL) 630 mg, Oral, 2 times daily    celecoxib (CELEBREX) 200 mg, Oral, 2 times daily    cetirizine (ZYRTEC) 10 mg Cap Oral    clonazePAM (KLONOPIN) 1 mg, Oral, 3 times daily    COQ10, UBIQUINOL, ORAL 100 mg, Oral, Daily    cyanocobalamin (VITAMIN B-12) 1,000 mcg, Oral, Daily    cycloSPORINE (RESTASIS) 0.05 % ophthalmic emulsion 1 drop, 2 times daily    desonide (DESOWEN) 0.05 % cream Topical (Top), 2 times daily    famotidine (PEPCID) 20 mg, Oral, 2 times daily     guaifenesin/dextromethorphan (ROBITUSSIN-COUGH-CHEST-TIERNEY ORAL) Oral    HYDROcodone-acetaminophen (NORCO) 7.5-325 mg per tablet 1 tablet, Oral, Every 6 hours PRN    ipratropium (ATROVENT) 42 mcg (0.06 %) nasal spray 2 sprays, Nasal, 4 times daily    levomefolate calcium (ELFOLATE) 15 mg, Oral, Daily    lisdexamfetamine (VYVANSE) 70 mg, Oral, Every morning    loratadine (CLARITIN) 10 mg, Oral, Daily    melatonin 10 mg, Oral, Daily    mupirocin (BACTROBAN) 2 % ointment Topical (Top)    mv,calcium,min/iron/folic/vitK (ONE-A-DAY WOMEN'S COMPLETE ORAL) Oral, Daily    nystatin (MYCOSTATIN) cream Topical (Top)    OXcarbazepine (TRILEPTAL) 300 mg, Oral, 2 times daily    pantoprazole (PROTONIX) 40 mg, Oral, Daily    REXULTI 1 mg, Oral, Daily    tiZANidine (ZANAFLEX) 4 mg, Oral, Daily    tolterodine (DETROL) 4 mg, Oral, Daily    traZODone (DESYREL) 200 mg, Oral, Nightly PRN    triamcinolone acetonide 0.1% (KENALOG) 0.1 % cream Topical (Top), Once as needed    TRINTELLIX 20 mg, Oral, Nightly    UNABLE TO FIND Inhalation, Every 6 hours, medication name: Prescription Cannabis    UNABLE TO FIND medication name: Sea Vitamin D3 1 capsule 50,000intl units oral daily    valACYclovir (VALTREX) 500 mg, Oral, 2 times daily, 1 tab po bid for 3 days prn    XARELTO 10 mg, Oral, Daily        Review of patient's allergies indicates:   Allergen Reactions    Erythromycin base Diarrhea and Nausea And Vomiting     Stomach cramps    Macrolide antibiotics Diarrhea and Rash     Other reaction(s): diarrhea/vomiting  Makes her sick   Makes her sick          Family History   Adopted: Yes           Review of Systems:    Constitution:   Denies chills, fever, and sweats.  HENT:   Denies headaches or blurry vision.  Cardiovascular:  Denies chest pain or irregular heart beat.  Respiratory:   Denies cough or shortness of breath.  Gastrointestinal:  Denies abdominal pain, nausea, or vomiting.  Musculoskeletal:   Denies muscle cramps.  Neurological:  "  Denies dizziness or focal weakness.  Psychiatric/Behavior: Normal mental status.  Hematology/Lymph:  Denies bleeding problem or easy bruising/bleeding.  Skin:    Denies rash or suspicious lesions.    Examination:    Vital Signs:    Vitals:    09/26/23 1454   BP: 106/73   Pulse: 63   Weight: 115 kg (253 lb 9.6 oz)   Height: 5' 7" (1.702 m)       Body mass index is 39.72 kg/m².    Constitution:   Well-developed, well nourished patient in no acute distress.  Neurological:   Alert and oriented x 3 and cooperative to examination.     Psychiatric/Behavior: Normal mental status.  Respiratory:   No shortness of breath.  Eyes:    Extraoccular muscles intact  Skin:    No scars, rash or suspicious lesions.    Musculoskeletal Exam :   Hip Exam: bilateral  No obvious deformity.   Flexion to 120 degrees and internal and external rotation to 40 degrees.   Abduction to 45 degree and adduction to 30 degrees.   Negative MILLI test.   Negative Stinchfield test.   No flexion contracture.   negative greater trochanteric tenderness.   Negative ANIRDUH test.   normal skin appearance and palpable pulses distally.   Sensibility normal.       X-rays: bilateral hips 3 views each hip  Well maintained joint spaces  No acute bony abnormalities     Assessment: Bilateral hip pain  -     X-Ray Hips Bilateral 2 View Inc AP Pelvis; Future; Expected date: 09/26/2023        Plan:  keep appt with Dr Mcdermott  Her pain is not hip joint related      DISCLAIMER: This note may have been dictated using voice recognition software and may contain grammatical errors.     NOTE: Consult report sent to referring provider via [employer] EMR.  "

## 2023-09-27 DIAGNOSIS — M25.511 RIGHT SHOULDER PAIN, UNSPECIFIED CHRONICITY: Primary | ICD-10-CM

## 2023-09-28 RX ORDER — BETAMETHASONE SODIUM PHOSPHATE AND BETAMETHASONE ACETATE 3; 3 MG/ML; MG/ML
6 INJECTION, SUSPENSION INTRA-ARTICULAR; INTRALESIONAL; INTRAMUSCULAR; SOFT TISSUE
Status: DISCONTINUED | OUTPATIENT
Start: 2023-09-11 | End: 2023-09-28 | Stop reason: HOSPADM

## 2023-09-28 RX ORDER — LIDOCAINE HYDROCHLORIDE 20 MG/ML
60 INJECTION, SOLUTION INFILTRATION; PERINEURAL
Status: DISCONTINUED | OUTPATIENT
Start: 2023-09-11 | End: 2023-09-28 | Stop reason: HOSPADM

## 2023-10-10 ENCOUNTER — OFFICE VISIT (OUTPATIENT)
Dept: ORTHOPEDICS | Facility: CLINIC | Age: 53
End: 2023-10-10
Payer: MEDICARE

## 2023-10-10 DIAGNOSIS — M25.511 RIGHT SHOULDER PAIN, UNSPECIFIED CHRONICITY: ICD-10-CM

## 2023-10-10 DIAGNOSIS — M75.111 INCOMPLETE TEAR OF RIGHT ROTATOR CUFF, UNSPECIFIED WHETHER TRAUMATIC: Primary | ICD-10-CM

## 2023-10-10 DIAGNOSIS — M67.813 TENDINOSIS OF RIGHT ROTATOR CUFF: ICD-10-CM

## 2023-10-10 PROCEDURE — 99213 PR OFFICE/OUTPT VISIT, EST, LEVL III, 20-29 MIN: ICD-10-PCS | Mod: ,,, | Performed by: REHABILITATION UNIT

## 2023-10-10 PROCEDURE — 99213 OFFICE O/P EST LOW 20 MIN: CPT | Mod: ,,, | Performed by: REHABILITATION UNIT

## 2023-10-10 NOTE — PROGRESS NOTES
Subjective:      Patient ID: Mini Chappell is a 53 y.o. female.    Chief Complaint: Results of the Right Shoulder (F/u for MRI results of the right shoulder, states pain came back two days after last visit, states pain has increased, )    HPI:   Mini Chappell is a 53 y.o. female who presents today for follow up evaluation of her bilateral shoulders.  She was last seen around a month ago.  She was provided with bilateral subacromial steroid injections.  She reports minimal pain relief with these.  She has persistent discomfort about her right shoulder with reduced motion and strength.  She has had an MRI completed in the interim of the right shoulder and is here to discuss the results.  No sensory or motor changes distally.  Pain is interfering with her activities of daily living.  Left shoulder is somewhat better but is still bothering her as well.    Initial HPI:    RHD. R>L.  Longstanding bilateral shoulder pain.  It is associated with loss of motion and strength.  She has seen another provider and was given steroid injection.  She reports that this was very helpful at providing some good but temporary relief.  Ultimately her pain returned.  She has known carpal tunnel syndrome to her bilateral upper extremities.  She has pain that radiates diffusely about the shoulder.  Her pain is maximally to the posterior aspect of her right shoulder.  She does have some occasional pain extends distally into the upper arm, elbow, and forearm.  She has tried physical therapy in the past.  She reports that therapy was not beneficial.  She has tried several medications with no relief.    Past Medical History:   Diagnosis Date    Anxiety     Bipolar affective     Chronic fatigue     Depression     Fibromyalgia     GERD (gastroesophageal reflux disease)     Migraines     Pulmonary embolism     Sleep apnea, unspecified     Unspecified osteoarthritis, unspecified site      Past Surgical History:   Procedure Laterality Date     DILATION AND CURETTAGE OF UTERUS      gastric sleeve      Heart monitor loop      HYSTERECTOMY      TUBAL LIGATION       Social History     Socioeconomic History    Marital status:    Tobacco Use    Smoking status: Former     Current packs/day: 0.00     Average packs/day: 0.5 packs/day for 12.0 years (6.0 ttl pk-yrs)     Types: Cigarettes     Start date: 11/3/2008     Quit date: 11/3/2020     Years since quittin.9    Smokeless tobacco: Never    Tobacco comments:     Patient smokes medical marijuana   Substance and Sexual Activity    Alcohol use: Not Currently    Drug use: Yes     Types: Marijuana     Comment: medical         Current Outpatient Medications:     albuterol (PROVENTIL/VENTOLIN HFA) 90 mcg/actuation inhaler, Inhale 2 puffs into the lungs every 6 (six) hours as needed for Wheezing. Rescue, Disp: 18 g, Rfl: 11    albuterol (PROVENTIL/VENTOLIN HFA) 90 mcg/actuation inhaler, Inhale into the lungs., Disp: , Rfl:     atorvastatin (LIPITOR) 40 MG tablet, Take 40 mg by mouth once daily., Disp: , Rfl:     BELSOMRA 10 mg Tab, Take 10 mg by mouth Daily., Disp: , Rfl:     calcium citrate/vitamin D3 (CITRACAL + D ORAL), Take 630 mg by mouth 2 (two) times a day., Disp: , Rfl:     cetirizine (ZYRTEC) 10 mg Cap, Take by mouth., Disp: , Rfl:     COQ10, UBIQUINOL, ORAL, Take 100 mg by mouth Daily., Disp: , Rfl:     cycloSPORINE (RESTASIS) 0.05 % ophthalmic emulsion, 1 drop 2 (two) times daily., Disp: , Rfl:     famotidine (PEPCID) 20 MG tablet, Take 20 mg by mouth 2 (two) times daily., Disp: , Rfl:     ipratropium (ATROVENT) 42 mcg (0.06 %) nasal spray, 2 sprays by Nasal route 4 (four) times daily., Disp: , Rfl:     levomefolate calcium (ELFOLATE) 15 mg Tab, Take 15 mg by mouth Daily., Disp: , Rfl:     melatonin 10 mg Tab, Take 10 mg by mouth Daily., Disp: , Rfl:     mupirocin (BACTROBAN) 2 % ointment, Apply topically., Disp: , Rfl:     mv,calcium,min/iron/folic/vitK (ONE-A-DAY WOMEN'S COMPLETE ORAL), Take by  mouth Daily., Disp: , Rfl:     nystatin (MYCOSTATIN) cream, Apply topically., Disp: , Rfl:     OXcarbazepine (TRILEPTAL) 300 MG Tab, Take 300 mg by mouth 2 (two) times daily., Disp: , Rfl:     pantoprazole (PROTONIX) 40 MG tablet, Take 40 mg by mouth once daily., Disp: , Rfl:     REXULTI 1 mg Tab, Take 1 mg by mouth Daily., Disp: , Rfl:     tiZANidine (ZANAFLEX) 4 MG tablet, Take 4 mg by mouth Daily., Disp: , Rfl:     traZODone (DESYREL) 100 MG tablet, Take 200 mg by mouth nightly as needed., Disp: , Rfl:     triamcinolone acetonide 0.1% (KENALOG) 0.1 % cream, Apply topically once as needed., Disp: , Rfl:     TRINTELLIX 20 mg Tab, Take 20 mg by mouth nightly., Disp: , Rfl:     UNABLE TO FIND, Inhale into the lungs every 6 (six) hours. medication name: Prescription Cannabis, Disp: , Rfl:     UNABLE TO FIND, medication name: Sea Vitamin D3 1 capsule 50,000intl units oral daily, Disp: , Rfl:     valACYclovir (VALTREX) 500 MG tablet, Take 500 mg by mouth 2 (two) times daily. 1 tab po bid for 3 days prn, Disp: , Rfl:     XARELTO 10 mg Tab, Take 10 mg by mouth Daily., Disp: , Rfl:     acetaminophen-codeine 300-30mg (TYLENOL #3) 300-30 mg Tab, Take 1 tablet by mouth every 6 (six) hours as needed., Disp: , Rfl:     celecoxib (CELEBREX) 100 MG capsule, Take 200 mg by mouth 2 (two) times daily., Disp: , Rfl:     clonazePAM (KLONOPIN) 1 MG tablet, Take 1 mg by mouth 3 (three) times daily., Disp: , Rfl:     cyanocobalamin (VITAMIN B-12) 1000 MCG tablet, Take 1,000 mcg by mouth once daily., Disp: , Rfl:     desonide (DESOWEN) 0.05 % cream, Apply topically 2 (two) times daily., Disp: , Rfl:     guaifenesin/dextromethorphan (ROBITUSSIN-COUGH-CHEST-TIERNEY ORAL), Take by mouth., Disp: , Rfl:     HYDROcodone-acetaminophen (NORCO) 7.5-325 mg per tablet, Take 1 tablet by mouth every 6 (six) hours as needed for Pain., Disp: 12 tablet, Rfl: 0    lisdexamfetamine (VYVANSE) 70 MG capsule, Take 70 mg by mouth every morning., Disp: , Rfl:      loratadine (CLARITIN) 10 mg tablet, Take 10 mg by mouth once daily., Disp: , Rfl:     tolterodine (DETROL) 2 MG Tab, Take 4 mg by mouth Daily., Disp: , Rfl:   Review of patient's allergies indicates:   Allergen Reactions    Erythromycin base Diarrhea and Nausea And Vomiting     Stomach cramps    Macrolide antibiotics Diarrhea and Rash     Other reaction(s): diarrhea/vomiting  Makes her sick   Makes her sick          There were no vitals taken for this visit.    Comprehensive review of systems completed and negative except as per HPI.        Objective:   Head: Normocephalic, without obvious abnormality, atraumatic  Eyes: conjunctivae/corneas clear. EOM's intact  Ears: normal external appearance  Nose: Nares normal. Septum midline. Mucosa normal. No drainage  Throat: normal findings: lips normal without lesions  Lungs: unlabored breathing on room air  Chest wall: symmetric chest rise  Heart: regular rate and rhythm  Pulses: 2+ and symmetric  Skin: Skin color, texture, turgor normal. No rashes or lesions  Neurologic: Grossly normal    bilateral SHOULDER    Appearance:   normal    Cervical Spine:   No ttp; full, painless ROM; negative Spurlings    Tenderness:   Diffusely about the right shoulder with no significant discrete tenderness to the left    AROM:   R , Abduction 150, ER 80, IR side pocket  L , Abduction 160, ER 80, IR T10    PROM:  same    Pain:  AROM: Positive  PROM:  Positive  End ROM: Positive  Supraspinatus strength testing: Positive  External rotation strength testing: Positive  Loraine-scapular: Positive  Virtually all provacative maneuvers Positive    Strength:  Supraspinatus: intact  External rotation: intact  Loraine-scapular: intact    Provocative Maneuvers:     Rotator Cuff/Biceps/AC Joint  Neer's Sign: Positive  Hawkin's Test: Positive  Painful arc: Positive  Belly Press: Negative  Bear Hugger Test: Negative  Hornblower's Sign: Negative  Speed's Test: Positive  Yergeson's Test:  Positive  Cross Arm Abduction: Positive    Pulses: Palpable radial pulse    Neurological deficits: None    The patient has a warm and well-perfused upper extremity with capillary refill less than 2 seconds. Sensation is intact to light touch in terminal nerve distributions. 5/5 ain/pin/uln. The patient has no palpable epitrochlear lymphadenopathy.      Assessment:     Imaging:   Four view radiographs of the right shoulder previously obtained show no acute fractures or dislocations.  Joint spaces are well maintained. Moderate AC arthritis.  Humeral head remains seated centrally within the glenoid.    Four view radiographs of the left shoulder previously obtained show no acute fractures or dislocations.  Joint spaces are well maintained. Moderate AC arthritis. Humeral head remains seated centrally within the glenoid.    MRI Shoulder Without Contrast Right  Narrative: EXAMINATION:  MRI SHOULDER WITHOUT CONTRAST RIGHT    CLINICAL HISTORY:  Shoulder pain, adhesive capsulitis suspected, xray done;  Pain in right shoulder    TECHNIQUE:  Multiplanar multislice images are were performed through the right shoulder.    COMPARISON:  Radiographs dated 09/11/2023    FINDINGS:  Small focus of partial-thickness articular surface tearing of the supraspinatus approximately 1.5 cm from the footplate, with associated tendinosis.  Infraspinatus, teres minor and subscapularis appear intact.  Synovitis is appreciated at the rotator interval.    There is no muscular fatty atrophy.    The long head biceps tendon is intact.    Subacromial spur is appreciated at the insertion of the coracoacromial ligament.  There is no lateral acromial downsloping, os acromiale or significant acromioclavicular osteoarthrosis.    There is  moderate subacromial/subdeltoid bursitis.  No glenohumeral joint effusion is appreciated.    Small sublabral recess is appreciated at the anterior superior glenoid labrum; no definitive labral tear or focal full-thickness  cartilage defect is appreciated.    Marrow signal is normal.  No Hill-Sachs deformity is appreciated.  Impression: Subacromial spur, subacromial/subdeltoid bursitis and tendinosis and partial-thickness articular surface tearing of the supraspinatus.  Findings may be seen with subacromial impingement syndrome in the appropriate clinical setting.    Electronically signed by: Mike Amato  Date:    10/06/2023  Time:    09:31    MRI of the right shoulder shows some bursitis and associated tendinosis of the supraspinatus with some partial thickness tearing on the articular surface.  Synovitis within the interval.        1. Incomplete tear of right rotator cuff, unspecified whether traumatic    2. Right shoulder pain, unspecified chronicity    3. Tendinosis of right rotator cuff            Plan:       Orders Placed This Encounter    Ambulatory referral/consult to Physical/Occupational Therapy       Imaging and exam findings discussed with the patient.  She has bilateral shoulder pain.  She has good motion and strength overall with no significant bony abnormality on plain radiographs outside of some AC arthritis.  She has had an MRI completed with results as above showing some bursitis, tendinosis, and partial-thickness tearing of the supraspinatus.  She also has some synovitis within the interval and bursitis.  She failed to get any substantial relief with a subacromial steroid injection.  We further discussed her options.  She is not done any formal physical therapy.  She elects to proceed with this.  Referral placed for MTS on [facility].  I will see her back in around 4-6 weeks.  I discussed that if she fails this she may benefit from arthroscopic shoulder surgery to address this partial cuff tear, rotator interval, possibly biceps, and subacromial decompression.  She will try therapy and continue symptomatic treatment with rest, ice, and anti-inflammatories in the interim.  All of her questions were answered and she was  in agreement.

## 2023-10-11 ENCOUNTER — OFFICE VISIT (OUTPATIENT)
Dept: URGENT CARE | Facility: CLINIC | Age: 53
End: 2023-10-11
Payer: MEDICARE

## 2023-10-11 VITALS
TEMPERATURE: 99 F | HEIGHT: 67 IN | SYSTOLIC BLOOD PRESSURE: 118 MMHG | BODY MASS INDEX: 39.71 KG/M2 | RESPIRATION RATE: 15 BRPM | OXYGEN SATURATION: 95 % | WEIGHT: 253 LBS | DIASTOLIC BLOOD PRESSURE: 79 MMHG | HEART RATE: 61 BPM

## 2023-10-11 DIAGNOSIS — I88.9 CERVICAL LYMPHADENITIS: Primary | ICD-10-CM

## 2023-10-11 DIAGNOSIS — J02.9 SORE THROAT: ICD-10-CM

## 2023-10-11 LAB
CTP QC/QA: YES
MOLECULAR STREP A: NEGATIVE
POC MOLECULAR INFLUENZA A AGN: NEGATIVE
POC MOLECULAR INFLUENZA B AGN: NEGATIVE
SARS-COV-2 RDRP RESP QL NAA+PROBE: NEGATIVE

## 2023-10-11 PROCEDURE — 87502 INFLUENZA DNA AMP PROBE: CPT | Mod: QW,,, | Performed by: FAMILY MEDICINE

## 2023-10-11 PROCEDURE — 87635: ICD-10-PCS | Mod: QW,,, | Performed by: FAMILY MEDICINE

## 2023-10-11 PROCEDURE — 87635 SARS-COV-2 COVID-19 AMP PRB: CPT | Mod: QW,,, | Performed by: FAMILY MEDICINE

## 2023-10-11 PROCEDURE — 99214 OFFICE O/P EST MOD 30 MIN: CPT | Mod: ,,, | Performed by: FAMILY MEDICINE

## 2023-10-11 PROCEDURE — 87651 STREP A DNA AMP PROBE: CPT | Mod: QW,,, | Performed by: FAMILY MEDICINE

## 2023-10-11 PROCEDURE — 87502 POCT INFLUENZA A/B MOLECULAR: ICD-10-PCS | Mod: QW,,, | Performed by: FAMILY MEDICINE

## 2023-10-11 PROCEDURE — 87651 POCT STREP A MOLECULAR: ICD-10-PCS | Mod: QW,,, | Performed by: FAMILY MEDICINE

## 2023-10-11 PROCEDURE — 99214 PR OFFICE/OUTPT VISIT, EST, LEVL IV, 30-39 MIN: ICD-10-PCS | Mod: ,,, | Performed by: FAMILY MEDICINE

## 2023-10-11 RX ORDER — AMOXICILLIN AND CLAVULANATE POTASSIUM 875; 125 MG/1; MG/1
1 TABLET, FILM COATED ORAL EVERY 12 HOURS
Qty: 14 TABLET | Refills: 0 | Status: SHIPPED | OUTPATIENT
Start: 2023-10-11 | End: 2023-10-18

## 2023-10-11 NOTE — PATIENT INSTRUCTIONS
Flonase and saline nasal spray twice a day, antihistamine at bedtime.  Force fluids.  Augmentin with food.

## 2023-10-11 NOTE — PROGRESS NOTES
"Subjective:      Patient ID: Mini Chappell is a 53 y.o. female.    Vitals:  height is 5' 7" (1.702 m) and weight is 114.8 kg (253 lb). Her temperature is 98.8 °F (37.1 °C). Her blood pressure is 118/79 and her pulse is 61. Her respiration is 15 and oxygen saturation is 95%.     Chief Complaint: Ear Problem (Patient complaining of ear pain and sore throat, a slight cough and she did have a fever of 102 last night. Symptoms started two days ago. She has takes tylenol for the pain.)    2 days of cough, congestion, otitis and fever.         Constitution: Positive for fever. Negative for chills and fatigue.   HENT:  Positive for postnasal drip. Negative for congestion, sinus pressure and trouble swallowing.    Neck: Negative for neck pain and neck stiffness.   Cardiovascular:  Negative for chest pain, leg swelling and sob on exertion.   Respiratory:  Positive for cough. Negative for chest tightness, shortness of breath and wheezing.    Neurological:  Negative for dizziness, disorientation and altered mental status.   Psychiatric/Behavioral:  Negative for altered mental status and disorientation.       Objective:     Physical Exam   Constitutional: She is oriented to person, place, and time. She appears well-developed.  Non-toxic appearance. No distress.   HENT:   Head: Normocephalic.   Ears:   Right Ear: Tympanic membrane and external ear normal.   Left Ear: Tympanic membrane and external ear normal.   Nose: Rhinorrhea present.   Mouth/Throat: Uvula is midline and mucous membranes are normal. No uvula swelling. Posterior oropharyngeal erythema and cobblestoning present. No oropharyngeal exudate or posterior oropharyngeal edema. Tonsils are 0 on the right. Tonsils are 0 on the left. No tonsillar exudate.   Eyes: Pupils are equal, round, and reactive to light. Right eye exhibits no discharge. Left eye exhibits no discharge.   Neck: Neck supple. No tracheal deviation present.   Cardiovascular: Normal rate, regular rhythm " and normal heart sounds.   No murmur heard.  Pulmonary/Chest: Effort normal and breath sounds normal. No stridor. No respiratory distress. She has no wheezes.   Lymphadenopathy:     She has cervical adenopathy.   Neurological: no focal deficit. She is alert and oriented to person, place, and time.   Skin: Skin is warm and dry.   Psychiatric: Mood and thought content normal.   Nursing note and vitals reviewed.      Assessment:     1. Cervical lymphadenitis    2. Sore throat        Plan:       Cervical lymphadenitis  -     amoxicillin-clavulanate 875-125mg (AUGMENTIN) 875-125 mg per tablet; Take 1 tablet by mouth every 12 (twelve) hours. for 7 days  Dispense: 14 tablet; Refill: 0    Sore throat  -     POCT Strep A, Molecular  -     POCT COVID-19 Rapid Screening  -     POCT Influenza A/B Molecular           COVID, Flu and Strep negative.

## 2023-11-08 DIAGNOSIS — G89.29 CHRONIC BILATERAL LOW BACK PAIN WITHOUT SCIATICA: Primary | ICD-10-CM

## 2023-11-08 DIAGNOSIS — M54.50 CHRONIC BILATERAL LOW BACK PAIN WITHOUT SCIATICA: Primary | ICD-10-CM

## 2023-11-13 ENCOUNTER — OFFICE VISIT (OUTPATIENT)
Dept: ORTHOPEDIC SURGERY | Facility: CLINIC | Age: 53
End: 2023-11-13
Payer: MEDICARE

## 2023-11-13 ENCOUNTER — HOSPITAL ENCOUNTER (OUTPATIENT)
Dept: RADIOLOGY | Facility: CLINIC | Age: 53
Discharge: HOME | End: 2023-11-13
Attending: ORTHOPAEDIC SURGERY
Payer: MEDICARE

## 2023-11-13 DIAGNOSIS — M54.16 LUMBAR RADICULOPATHY: ICD-10-CM

## 2023-11-13 DIAGNOSIS — M54.50 CHRONIC BILATERAL LOW BACK PAIN WITHOUT SCIATICA: ICD-10-CM

## 2023-11-13 DIAGNOSIS — G89.29 CHRONIC BILATERAL LOW BACK PAIN WITHOUT SCIATICA: ICD-10-CM

## 2023-11-13 DIAGNOSIS — M43.10 DEGENERATIVE SPONDYLOLISTHESIS: Primary | ICD-10-CM

## 2023-11-13 DIAGNOSIS — M47.816 LUMBAR SPONDYLOSIS: ICD-10-CM

## 2023-11-13 DIAGNOSIS — M51.36 DDD (DEGENERATIVE DISC DISEASE), LUMBAR: ICD-10-CM

## 2023-11-13 PROCEDURE — 1159F PR MEDICATION LIST DOCUMENTED IN MEDICAL RECORD: ICD-10-PCS | Mod: CPTII,S$GLB,, | Performed by: ORTHOPAEDIC SURGERY

## 2023-11-13 PROCEDURE — 72120 X-RAY BEND ONLY L-S SPINE: CPT | Mod: ,,, | Performed by: ORTHOPAEDIC SURGERY

## 2023-11-13 PROCEDURE — 99204 PR OFFICE/OUTPT VISIT, NEW, LEVL IV, 45-59 MIN: ICD-10-PCS | Mod: S$GLB,,, | Performed by: ORTHOPAEDIC SURGERY

## 2023-11-13 PROCEDURE — 1159F MED LIST DOCD IN RCRD: CPT | Mod: CPTII,S$GLB,, | Performed by: ORTHOPAEDIC SURGERY

## 2023-11-13 PROCEDURE — 99204 OFFICE O/P NEW MOD 45 MIN: CPT | Mod: S$GLB,,, | Performed by: ORTHOPAEDIC SURGERY

## 2023-11-13 PROCEDURE — 72120 XR LUMBAR SPINE FLEXION AND EXTENSION ONLY: ICD-10-PCS | Mod: ,,, | Performed by: ORTHOPAEDIC SURGERY

## 2023-11-13 PROCEDURE — 1160F RVW MEDS BY RX/DR IN RCRD: CPT | Mod: CPTII,S$GLB,, | Performed by: ORTHOPAEDIC SURGERY

## 2023-11-13 PROCEDURE — 1160F PR REVIEW ALL MEDS BY PRESCRIBER/CLIN PHARMACIST DOCUMENTED: ICD-10-PCS | Mod: CPTII,S$GLB,, | Performed by: ORTHOPAEDIC SURGERY

## 2023-11-13 NOTE — PROGRESS NOTES
DATE: 11/13/2023  PATIENT: Mini Chappell    Attending Physician: Kenyon Mcdermott M.D.    CHIEF COMPLAINT: LBP and BLE Pain    HISTORY:  Mini Chappell is a 53 y.o. female w/ a hx of fibromyalgia presents for initial evaluation of low back and b/l leg pain (Back - 7, Leg - 7). The pain has been present for a few years. The patient describes the pain as dull and it radiates posterolaterally down BLE to the feet.  The pain is worse with activity and improved by rest. There is BLE associated numbness and tingling. There is BLE subjective weakness. Prior treatments have included tylenol #3 and MARCO ANTONIO x2 (did not help), but no PT or surgery.    The Patient denies myelopathic symptoms such as handwriting changes or difficulty with buttons/coins/keys. Denies perineal paresthesias, bowel/bladder dysfunction.    The patient does not smoke, have DM or endorse IVDU. The patient takes Xarelto for history of Pex2. She is disabled due to depression and bipolar.    PAST MEDICAL/SURGICAL HISTORY:  Past Medical History:   Diagnosis Date    Anxiety     Bipolar affective     Chronic fatigue     Depression     Fibromyalgia     GERD (gastroesophageal reflux disease)     Migraines     Pulmonary embolism     Sleep apnea, unspecified     Unspecified osteoarthritis, unspecified site      Past Surgical History:   Procedure Laterality Date    DILATION AND CURETTAGE OF UTERUS      gastric sleeve      Heart monitor loop      HYSTERECTOMY      TUBAL LIGATION         Current Medications:   Current Outpatient Medications:     albuterol (PROVENTIL/VENTOLIN HFA) 90 mcg/actuation inhaler, Inhale 2 puffs into the lungs every 6 (six) hours as needed for Wheezing. Rescue, Disp: 18 g, Rfl: 11    albuterol (PROVENTIL/VENTOLIN HFA) 90 mcg/actuation inhaler, Inhale into the lungs., Disp: , Rfl:     atorvastatin (LIPITOR) 40 MG tablet, Take 40 mg by mouth once daily., Disp: , Rfl:     BELSOMRA 10 mg Tab, Take 10 mg by mouth Daily., Disp: , Rfl:     calcium  citrate/vitamin D3 (CITRACAL + D ORAL), Take 630 mg by mouth 2 (two) times a day., Disp: , Rfl:     cetirizine (ZYRTEC) 10 mg Cap, Take by mouth., Disp: , Rfl:     clonazePAM (KLONOPIN) 1 MG tablet, Take 1 mg by mouth 3 (three) times daily., Disp: , Rfl:     COQ10, UBIQUINOL, ORAL, Take 100 mg by mouth Daily., Disp: , Rfl:     cycloSPORINE (RESTASIS) 0.05 % ophthalmic emulsion, 1 drop 2 (two) times daily., Disp: , Rfl:     famotidine (PEPCID) 20 MG tablet, Take 20 mg by mouth 2 (two) times daily., Disp: , Rfl:     ipratropium (ATROVENT) 42 mcg (0.06 %) nasal spray, 2 sprays by Nasal route 4 (four) times daily., Disp: , Rfl:     levomefolate calcium (ELFOLATE) 15 mg Tab, Take 15 mg by mouth Daily., Disp: , Rfl:     melatonin 10 mg Tab, Take 10 mg by mouth Daily., Disp: , Rfl:     mupirocin (BACTROBAN) 2 % ointment, Apply topically., Disp: , Rfl:     mv,calcium,min/iron/folic/vitK (ONE-A-DAY WOMEN'S COMPLETE ORAL), Take by mouth Daily., Disp: , Rfl:     nystatin (MYCOSTATIN) cream, Apply topically., Disp: , Rfl:     OXcarbazepine (TRILEPTAL) 300 MG Tab, Take 300 mg by mouth 2 (two) times daily., Disp: , Rfl:     pantoprazole (PROTONIX) 40 MG tablet, Take 40 mg by mouth once daily., Disp: , Rfl:     REXULTI 1 mg Tab, Take 1 mg by mouth Daily., Disp: , Rfl:     tiZANidine (ZANAFLEX) 4 MG tablet, Take 4 mg by mouth Daily., Disp: , Rfl:     traZODone (DESYREL) 100 MG tablet, Take 200 mg by mouth nightly as needed., Disp: , Rfl:     triamcinolone acetonide 0.1% (KENALOG) 0.1 % cream, Apply topically once as needed., Disp: , Rfl:     TRINTELLIX 20 mg Tab, Take 20 mg by mouth nightly., Disp: , Rfl:     UNABLE TO FIND, Inhale into the lungs every 6 (six) hours. medication name: Prescription Cannabis, Disp: , Rfl:     UNABLE TO FIND, medication name: Sea Vitamin D3 1 capsule 50,000intl units oral daily, Disp: , Rfl:     valACYclovir (VALTREX) 500 MG tablet, Take 500 mg by mouth 2 (two) times daily. 1 tab po bid for 3 days  prn, Disp: , Rfl:     XARELTO 10 mg Tab, Take 10 mg by mouth Daily., Disp: , Rfl:     acetaminophen-codeine 300-30mg (TYLENOL #3) 300-30 mg Tab, Take 1 tablet by mouth every 6 (six) hours as needed., Disp: , Rfl:     celecoxib (CELEBREX) 100 MG capsule, Take 200 mg by mouth 2 (two) times daily., Disp: , Rfl:     cyanocobalamin (VITAMIN B-12) 1000 MCG tablet, Take 1,000 mcg by mouth once daily., Disp: , Rfl:     desonide (DESOWEN) 0.05 % cream, Apply topically 2 (two) times daily., Disp: , Rfl:     guaifenesin/dextromethorphan (ROBITUSSIN-COUGH-CHEST-TIERNEY ORAL), Take by mouth., Disp: , Rfl:     HYDROcodone-acetaminophen (NORCO) 7.5-325 mg per tablet, Take 1 tablet by mouth every 6 (six) hours as needed for Pain., Disp: 12 tablet, Rfl: 0    lisdexamfetamine (VYVANSE) 70 MG capsule, Take 70 mg by mouth every morning., Disp: , Rfl:     loratadine (CLARITIN) 10 mg tablet, Take 10 mg by mouth once daily., Disp: , Rfl:     tolterodine (DETROL) 2 MG Tab, Take 4 mg by mouth Daily., Disp: , Rfl:     Social History:   Social History     Socioeconomic History    Marital status:    Tobacco Use    Smoking status: Former     Current packs/day: 0.00     Average packs/day: 0.5 packs/day for 12.0 years (6.0 ttl pk-yrs)     Types: Cigarettes     Start date: 11/3/2008     Quit date: 11/3/2020     Years since quitting: 3.0    Smokeless tobacco: Never    Tobacco comments:     Patient smokes medical marijuana   Substance and Sexual Activity    Alcohol use: Not Currently    Drug use: Yes     Types: Marijuana     Comment: medical       REVIEW OF SYSTEMS:  Constitution: Negative. Negative for chills, fever and night sweats.   Cardiovascular: Negative for chest pain and syncope.   Respiratory: Negative for cough and shortness of breath.   Gastrointestinal: See HPI. Negative for nausea/vomiting. Negative for abdominal pain.  Genitourinary: See HPI. Negative for discoloration or dysuria.  Hematologic/Lymphatic: negative for  bleeding/clotting disorders.   Musculoskeletal: Negative for falls and muscle weakness.   Neurological: See HPI. no history of seizures. no history of cranial surgery or shunts.  Neurological: See HPI. No seizures.   Endocrine: Negative for polydipsia, polyphagia and polyuria.   Allergic/Immunologic: Negative for hives and persistent infections.     EXAM:  There were no vitals taken for this visit.    PHYSICAL EXAMINATION:    General: The patient is a 53 y.o. female in no apparent distress, the patient is orientatied to person, place and time.  Psych: Normal mood and affect  HEENT: Vision grossly intact, hearing intact to the spoken word.  Lungs: Respirations unlabored.  Gait: Normal station and gait, no difficulty with toe or heel walk.   Skin: Dorsal lumbar skin negative for rashes, lesions, hairy patches and surgical scars. There is lower lumbar tenderness to palpation.  Range of motion: Lumbar range of motion is acceptable.  Spinal Balance: Global saggital and coronal spinal balance acceptable, no significant for scoliosis and kyphosis.  Musculoskeletal: No pain with the range of motion of the bilateral hips. No trochanteric tenderness to palpation.  Vascular: Bilateral lower extremities warm and well perfused, Dorsalis pedis pulses 2+ bilaterally.  Neurological: Normal strength and tone in all major motor groups in the bilateral lower extremities except for 4/5 in b/l HF. Normal sensation to light touch in the L2-S1 dermatomes bilaterally.  Deep tendon reflexes symmetric 2+ in the bilateral lower extremities.  Negative Babinski bilaterally. Straight leg raise negative bilaterally.    IMAGING:   Today I independently reviewed the following images and my interpretations are as follows:    AP, Lat and Flex/Ex  upright L-spine demonstrate spondylosis and DDD. L4-5 anterolisthesis measuring 6mm.    MRI lumbar ([facility], uploaded onto PACS) showed L4-5 mod central and L foraminal stenosis.      There is no height or  "weight on file to calculate BMI.  No results found for: "HGBA1C"    ASSESSMENT/PLAN:    Mini was seen today for low-back pain.    Diagnoses and all orders for this visit:    Degenerative spondylolisthesis    Chronic bilateral low back pain without sciatica  -     Ambulatory referral/consult to Orthopedics    Lumbar spondylosis    DDD (degenerative disc disease), lumbar    Lumbar radiculopathy      Follow up in about 2 months (around 1/13/2024).    Patient has lumbar degenerative spondylolisthesis and stenosis with BLE radiculopathy. I discussed the natural history of their diagnoses as well as surgical and nonsurgical treatment options. I educated the patient on the importance of core/back strengthening, correct posture, bending/lifting ergonomics, and low-impact aerobic exercises (walking, elliptical, and aquatherapy). Continue medications. I will refer the patient to PT for core/back strengthening. Patient will follow up in 2 months for re-evaluation. Patient is a candidate for L4-5 PLDF/TLIF (L) if she fails conservative management.    Kenyon Mcdermott MD  Orthopaedic Spine Surgeon  Department of Orthopaedic Surgery  [phone]      "

## 2023-11-14 DIAGNOSIS — M54.50 CHRONIC BILATERAL LOW BACK PAIN WITHOUT SCIATICA: Primary | ICD-10-CM

## 2023-11-14 DIAGNOSIS — G89.29 CHRONIC BILATERAL LOW BACK PAIN WITHOUT SCIATICA: Primary | ICD-10-CM

## 2023-11-28 ENCOUNTER — OFFICE VISIT (OUTPATIENT)
Dept: ORTHOPEDICS | Facility: CLINIC | Age: 53
End: 2023-11-28
Payer: MEDICARE

## 2023-11-28 VITALS
DIASTOLIC BLOOD PRESSURE: 81 MMHG | SYSTOLIC BLOOD PRESSURE: 115 MMHG | BODY MASS INDEX: 39.71 KG/M2 | HEIGHT: 67 IN | HEART RATE: 80 BPM | WEIGHT: 253 LBS

## 2023-11-28 DIAGNOSIS — M75.111 INCOMPLETE TEAR OF RIGHT ROTATOR CUFF, UNSPECIFIED WHETHER TRAUMATIC: ICD-10-CM

## 2023-11-28 DIAGNOSIS — M67.813 TENDINOSIS OF RIGHT ROTATOR CUFF: Primary | ICD-10-CM

## 2023-11-28 PROCEDURE — 1159F PR MEDICATION LIST DOCUMENTED IN MEDICAL RECORD: ICD-10-PCS | Mod: CPTII,,, | Performed by: REHABILITATION UNIT

## 2023-11-28 PROCEDURE — 99213 OFFICE O/P EST LOW 20 MIN: CPT | Mod: ,,, | Performed by: REHABILITATION UNIT

## 2023-11-28 PROCEDURE — 3074F SYST BP LT 130 MM HG: CPT | Mod: CPTII,,, | Performed by: REHABILITATION UNIT

## 2023-11-28 PROCEDURE — 99213 PR OFFICE/OUTPT VISIT, EST, LEVL III, 20-29 MIN: ICD-10-PCS | Mod: ,,, | Performed by: REHABILITATION UNIT

## 2023-11-28 PROCEDURE — 3074F PR MOST RECENT SYSTOLIC BLOOD PRESSURE < 130 MM HG: ICD-10-PCS | Mod: CPTII,,, | Performed by: REHABILITATION UNIT

## 2023-11-28 PROCEDURE — 3079F DIAST BP 80-89 MM HG: CPT | Mod: CPTII,,, | Performed by: REHABILITATION UNIT

## 2023-11-28 PROCEDURE — 1159F MED LIST DOCD IN RCRD: CPT | Mod: CPTII,,, | Performed by: REHABILITATION UNIT

## 2023-11-28 PROCEDURE — 3008F PR BODY MASS INDEX (BMI) DOCUMENTED: ICD-10-PCS | Mod: CPTII,,, | Performed by: REHABILITATION UNIT

## 2023-11-28 PROCEDURE — 3079F PR MOST RECENT DIASTOLIC BLOOD PRESSURE 80-89 MM HG: ICD-10-PCS | Mod: CPTII,,, | Performed by: REHABILITATION UNIT

## 2023-11-28 PROCEDURE — 3008F BODY MASS INDEX DOCD: CPT | Mod: CPTII,,, | Performed by: REHABILITATION UNIT

## 2023-11-28 RX ORDER — AMITRIPTYLINE HYDROCHLORIDE 75 MG/1
75 TABLET ORAL NIGHTLY
COMMUNITY
Start: 2023-11-16

## 2023-11-28 NOTE — PROGRESS NOTES
Subjective:      Patient ID: Mini Chappell is a 53 y.o. female.    Chief Complaint: Follow-up (FERMIN shoulder pain, right one is worse, last PT note was from 11/2/23 had only been able to go to one session because of the holiday but thinks its made difference with her ROM and wants to continue her PT for now, has been doing some things from home to help relieve it  )    HPI:   Mini Chappell is a 53 y.o. female who presents today for follow up evaluation of her bilateral shoulders.  She has tried physical therapy in the interim and is pleased with her progress.  She is seen improvement in her motion, pain, and strength.  She is pleased with her progress and would like to continue with therapy.  No sensory or motor changes distally.     Initial HPI:    RHD. R>L.  Longstanding bilateral shoulder pain.  It is associated with loss of motion and strength.  She has seen another provider and was given steroid injection.  She reports that this was very helpful at providing some good but temporary relief.  Ultimately her pain returned.  She has known carpal tunnel syndrome to her bilateral upper extremities.  She has pain that radiates diffusely about the shoulder.  Her pain is maximally to the posterior aspect of her right shoulder.  She does have some occasional pain extends distally into the upper arm, elbow, and forearm.  She has tried physical therapy in the past.  She reports that therapy was not beneficial.  She has tried several medications with no relief.    Past Medical History:   Diagnosis Date    Anxiety     Bipolar affective     Chronic fatigue     Depression     Fibromyalgia     GERD (gastroesophageal reflux disease)     Migraines     Pulmonary embolism     Sleep apnea, unspecified     Unspecified osteoarthritis, unspecified site      Past Surgical History:   Procedure Laterality Date    DILATION AND CURETTAGE OF UTERUS      gastric sleeve      Heart monitor loop      HYSTERECTOMY      TUBAL LIGATION        Social History     Socioeconomic History    Marital status:    Tobacco Use    Smoking status: Former     Current packs/day: 0.00     Average packs/day: 0.5 packs/day for 12.0 years (6.0 ttl pk-yrs)     Types: Cigarettes     Start date: 11/3/2008     Quit date: 11/3/2020     Years since quitting: 3.0    Smokeless tobacco: Never    Tobacco comments:     Patient smokes medical marijuana   Substance and Sexual Activity    Alcohol use: Not Currently    Drug use: Yes     Types: Marijuana     Comment: medical         Current Outpatient Medications:     albuterol (PROVENTIL/VENTOLIN HFA) 90 mcg/actuation inhaler, Inhale 2 puffs into the lungs every 6 (six) hours as needed for Wheezing. Rescue, Disp: 18 g, Rfl: 11    albuterol (PROVENTIL/VENTOLIN HFA) 90 mcg/actuation inhaler, Inhale into the lungs., Disp: , Rfl:     amitriptyline (ELAVIL) 75 MG tablet, Take 75 mg by mouth every evening., Disp: , Rfl:     atorvastatin (LIPITOR) 40 MG tablet, Take 40 mg by mouth once daily., Disp: , Rfl:     BELSOMRA 10 mg Tab, Take 10 mg by mouth Daily., Disp: , Rfl:     calcium citrate/vitamin D3 (CITRACAL + D ORAL), Take 630 mg by mouth 2 (two) times a day., Disp: , Rfl:     cetirizine (ZYRTEC) 10 mg Cap, Take by mouth., Disp: , Rfl:     clonazePAM (KLONOPIN) 1 MG tablet, Take 1 mg by mouth 3 (three) times daily., Disp: , Rfl:     COQ10, UBIQUINOL, ORAL, Take 100 mg by mouth Daily., Disp: , Rfl:     cycloSPORINE (RESTASIS) 0.05 % ophthalmic emulsion, 1 drop 2 (two) times daily., Disp: , Rfl:     famotidine (PEPCID) 20 MG tablet, Take 20 mg by mouth 2 (two) times daily., Disp: , Rfl:     ipratropium (ATROVENT) 42 mcg (0.06 %) nasal spray, 2 sprays by Nasal route 4 (four) times daily., Disp: , Rfl:     levomefolate calcium (ELFOLATE) 15 mg Tab, Take 15 mg by mouth Daily., Disp: , Rfl:     melatonin 10 mg Tab, Take 10 mg by mouth Daily., Disp: , Rfl:     mupirocin (BACTROBAN) 2 % ointment, Apply topically., Disp: , Rfl:      mv,calcium,min/iron/folic/vitK (ONE-A-DAY WOMEN'S COMPLETE ORAL), Take by mouth Daily., Disp: , Rfl:     nystatin (MYCOSTATIN) cream, Apply topically., Disp: , Rfl:     OXcarbazepine (TRILEPTAL) 300 MG Tab, Take 300 mg by mouth 2 (two) times daily., Disp: , Rfl:     pantoprazole (PROTONIX) 40 MG tablet, Take 40 mg by mouth once daily., Disp: , Rfl:     REXULTI 1 mg Tab, Take 1 mg by mouth Daily., Disp: , Rfl:     tiZANidine (ZANAFLEX) 4 MG tablet, Take 4 mg by mouth Daily., Disp: , Rfl:     triamcinolone acetonide 0.1% (KENALOG) 0.1 % cream, Apply topically once as needed., Disp: , Rfl:     TRINTELLIX 20 mg Tab, Take 20 mg by mouth nightly., Disp: , Rfl:     UNABLE TO FIND, Inhale into the lungs every 6 (six) hours. medication name: Prescription Cannabis, Disp: , Rfl:     UNABLE TO FIND, medication name: Sea Vitamin D3 1 capsule 50,000intl units oral daily, Disp: , Rfl:     valACYclovir (VALTREX) 500 MG tablet, Take 500 mg by mouth 2 (two) times daily. 1 tab po bid for 3 days prn, Disp: , Rfl:     XARELTO 10 mg Tab, Take 10 mg by mouth Daily., Disp: , Rfl:     acetaminophen-codeine 300-30mg (TYLENOL #3) 300-30 mg Tab, Take 1 tablet by mouth every 6 (six) hours as needed., Disp: , Rfl:     celecoxib (CELEBREX) 100 MG capsule, Take 200 mg by mouth 2 (two) times daily., Disp: , Rfl:     cyanocobalamin (VITAMIN B-12) 1000 MCG tablet, Take 1,000 mcg by mouth once daily., Disp: , Rfl:     desonide (DESOWEN) 0.05 % cream, Apply topically 2 (two) times daily., Disp: , Rfl:     guaifenesin/dextromethorphan (ROBITUSSIN-COUGH-CHEST-TIERNEY ORAL), Take by mouth., Disp: , Rfl:     HYDROcodone-acetaminophen (NORCO) 7.5-325 mg per tablet, Take 1 tablet by mouth every 6 (six) hours as needed for Pain., Disp: 12 tablet, Rfl: 0    lisdexamfetamine (VYVANSE) 70 MG capsule, Take 70 mg by mouth every morning., Disp: , Rfl:     loratadine (CLARITIN) 10 mg tablet, Take 10 mg by mouth once daily., Disp: , Rfl:     tolterodine (DETROL) 2  "MG Tab, Take 4 mg by mouth Daily., Disp: , Rfl:     traZODone (DESYREL) 100 MG tablet, Take 200 mg by mouth nightly as needed., Disp: , Rfl:   Review of patient's allergies indicates:   Allergen Reactions    Erythromycin base Diarrhea and Nausea And Vomiting     Stomach cramps    Macrolide antibiotics Diarrhea and Rash     Other reaction(s): diarrhea/vomiting  Makes her sick   Makes her sick          /81 (BP Location: Left arm, Patient Position: Sitting)   Pulse 80   Ht 5' 7" (1.702 m)   Wt 114.8 kg (253 lb)   BMI 39.63 kg/m²     Comprehensive review of systems completed and negative except as per HPI.        Objective:   Head: Normocephalic, without obvious abnormality, atraumatic  Eyes: conjunctivae/corneas clear. EOM's intact  Ears: normal external appearance  Nose: Nares normal. Septum midline. Mucosa normal. No drainage  Throat: normal findings: lips normal without lesions  Lungs: unlabored breathing on room air  Chest wall: symmetric chest rise  Heart: regular rate and rhythm  Pulses: 2+ and symmetric  Skin: Skin color, texture, turgor normal. No rashes or lesions  Neurologic: Grossly normal    bilateral SHOULDER    Appearance:   normal    Cervical Spine:   No ttp; full, painless ROM; negative Spurlings    Tenderness:   Diffusely about the right shoulder with no significant discrete tenderness to the left    AROM:   R , Abduction 160, ER 80, IR L4  L , Abduction 160, ER 80, IR T10    PROM:  same    Pain:  AROM: -  PROM:  -  End ROM: Positive  Supraspinatus strength testing: -  External rotation strength testing: -  Loraine-scapular: -  Virtually all provacative maneuvers -    Strength:  Supraspinatus: intact  External rotation: intact  Loraine-scapular: intact    Provocative Maneuvers:     Rotator Cuff/Biceps/AC Joint  Neer's Sign: Positive  Hawkin's Test: Positive  Painful arc: Positive  Belly Press: Negative  Bear Hugger Test: Negative  Hornblower's Sign: Negative  Speed's Test: " Positive  Yergeson's Test: Positive  Cross Arm Abduction: Positive    Pulses: Palpable radial pulse    Neurological deficits: None    The patient has a warm and well-perfused upper extremity with capillary refill less than 2 seconds. Sensation is intact to light touch in terminal nerve distributions. 5/5 ain/pin/uln. The patient has no palpable epitrochlear lymphadenopathy.      Assessment:     Imaging:   Four view radiographs of the right shoulder previously obtained show no acute fractures or dislocations.  Joint spaces are well maintained. Moderate AC arthritis.  Humeral head remains seated centrally within the glenoid.    Four view radiographs of the left shoulder previously obtained show no acute fractures or dislocations.  Joint spaces are well maintained. Moderate AC arthritis. Humeral head remains seated centrally within the glenoid.    Narrative & Impression  EXAMINATION:  MRI SHOULDER WITHOUT CONTRAST RIGHT     CLINICAL HISTORY:  Shoulder pain, adhesive capsulitis suspected, xray done;  Pain in right shoulder     TECHNIQUE:  Multiplanar multislice images are were performed through the right shoulder.     COMPARISON:  Radiographs dated 09/11/2023     FINDINGS:  Small focus of partial-thickness articular surface tearing of the supraspinatus approximately 1.5 cm from the footplate, with associated tendinosis.  Infraspinatus, teres minor and subscapularis appear intact.  Synovitis is appreciated at the rotator interval.     There is no muscular fatty atrophy.     The long head biceps tendon is intact.     Subacromial spur is appreciated at the insertion of the coracoacromial ligament.  There is no lateral acromial downsloping, os acromiale or significant acromioclavicular osteoarthrosis.     There is  moderate subacromial/subdeltoid bursitis.  No glenohumeral joint effusion is appreciated.     Small sublabral recess is appreciated at the anterior superior glenoid labrum; no definitive labral tear or focal  full-thickness cartilage defect is appreciated.     Marrow signal is normal.  No Hill-Sachs deformity is appreciated.     Impression:     Subacromial spur, subacromial/subdeltoid bursitis and tendinosis and partial-thickness articular surface tearing of the supraspinatus.  Findings may be seen with subacromial impingement syndrome in the appropriate clinical setting.        Electronically signed by: Mike Amato  Date:                                            10/06/2023  Time:                                           09:31    MRI of the right shoulder shows some bursitis and associated tendinosis of the supraspinatus with some partial thickness tearing on the articular surface.  Synovitis within the interval.        1. Tendinosis of right rotator cuff    2. Incomplete tear of right rotator cuff, unspecified whether traumatic              Plan:          Imaging and exam findings discussed with the patient.  She has bilateral shoulder pain and has improved well with physical therapy.  She is had injections previously as well.  MRI showed bursitis, tendinosis, and partial-thickness tearing of the supraspinatus to the right shoulder.  She also has some synovitis within the interval and bursitis.  She has progressed with therapy and is pleased with her progress.  She has only been to a couple of sessions.  She is going to continue with therapy and transition to a home exercise program.  She will follow up with me as needed if she fails to see continued improvement. Continue symptomatic treatment with rest, ice, and anti-inflammatories in the interim.  All of her questions were answered and she was in agreement.

## 2023-12-20 ENCOUNTER — OFFICE VISIT (OUTPATIENT)
Dept: ORTHOPEDICS | Facility: CLINIC | Age: 53
End: 2023-12-20
Payer: MEDICARE

## 2023-12-20 VITALS — SYSTOLIC BLOOD PRESSURE: 139 MMHG | HEART RATE: 88 BPM | DIASTOLIC BLOOD PRESSURE: 85 MMHG

## 2023-12-20 DIAGNOSIS — M75.80 ROTATOR CUFF TENDINITIS, UNSPECIFIED LATERALITY: Primary | ICD-10-CM

## 2023-12-20 PROCEDURE — 99213 OFFICE O/P EST LOW 20 MIN: CPT | Mod: 25,,, | Performed by: REHABILITATION UNIT

## 2023-12-20 PROCEDURE — 3079F DIAST BP 80-89 MM HG: CPT | Mod: CPTII,,, | Performed by: REHABILITATION UNIT

## 2023-12-20 PROCEDURE — 20610 LARGE JOINT ASPIRATION/INJECTION: BILATERAL SUBACROMIAL BURSA: ICD-10-PCS | Mod: 50,,, | Performed by: REHABILITATION UNIT

## 2023-12-20 PROCEDURE — 3075F PR MOST RECENT SYSTOLIC BLOOD PRESS GE 130-139MM HG: ICD-10-PCS | Mod: CPTII,,, | Performed by: REHABILITATION UNIT

## 2023-12-20 PROCEDURE — 20610 DRAIN/INJ JOINT/BURSA W/O US: CPT | Mod: 50,,, | Performed by: REHABILITATION UNIT

## 2023-12-20 PROCEDURE — 3079F PR MOST RECENT DIASTOLIC BLOOD PRESSURE 80-89 MM HG: ICD-10-PCS | Mod: CPTII,,, | Performed by: REHABILITATION UNIT

## 2023-12-20 PROCEDURE — 3075F SYST BP GE 130 - 139MM HG: CPT | Mod: CPTII,,, | Performed by: REHABILITATION UNIT

## 2023-12-20 PROCEDURE — 99213 PR OFFICE/OUTPT VISIT, EST, LEVL III, 20-29 MIN: ICD-10-PCS | Mod: 25,,, | Performed by: REHABILITATION UNIT

## 2023-12-20 RX ORDER — LIDOCAINE HYDROCHLORIDE 20 MG/ML
60 INJECTION, SOLUTION INFILTRATION; PERINEURAL
Status: DISCONTINUED | OUTPATIENT
Start: 2023-12-20 | End: 2023-12-20 | Stop reason: HOSPADM

## 2023-12-20 RX ORDER — METHYLPREDNISOLONE ACETATE 80 MG/ML
80 INJECTION, SUSPENSION INTRA-ARTICULAR; INTRALESIONAL; INTRAMUSCULAR; SOFT TISSUE
Status: DISCONTINUED | OUTPATIENT
Start: 2023-12-20 | End: 2023-12-20 | Stop reason: HOSPADM

## 2023-12-20 RX ADMIN — METHYLPREDNISOLONE ACETATE 80 MG: 80 INJECTION, SUSPENSION INTRA-ARTICULAR; INTRALESIONAL; INTRAMUSCULAR; SOFT TISSUE at 01:12

## 2023-12-20 RX ADMIN — LIDOCAINE HYDROCHLORIDE 60 MG: 20 INJECTION, SOLUTION INFILTRATION; PERINEURAL at 01:12

## 2023-12-20 NOTE — PROGRESS NOTES
Subjective:      Patient ID: Mini Chappell is a 53 y.o. female.    Chief Complaint: Follow-up (FERMIN shoulder cortisone inj was 9/11/23, states it helped but would like another injection last PT session is tomorrow)    HPI:   Mini Chappell is a 53 y.o. female who presents today for follow up evaluation of her bilateral shoulders.  She has been in physical therapy and her last session is tomorrow.  She has seen some good improvement but still has some discomfort about her shoulders.  Shoulder pain is lateral.  No sensory or motor changes distally.  She has had an injection into her shoulders over 3 months ago.  This was very helpful.  She is interested in more injections today.    Initial HPI:    RHD. R>L.  Longstanding bilateral shoulder pain.  It is associated with loss of motion and strength.  She has seen another provider and was given steroid injection.  She reports that this was very helpful at providing some good but temporary relief.  Ultimately her pain returned.  She has known carpal tunnel syndrome to her bilateral upper extremities.  She has pain that radiates diffusely about the shoulder.  Her pain is maximally to the posterior aspect of her right shoulder.  She does have some occasional pain extends distally into the upper arm, elbow, and forearm.  She has tried physical therapy in the past.  She reports that therapy was not beneficial.  She has tried several medications with no relief.    Past Medical History:   Diagnosis Date    Anxiety     Bipolar affective     Chronic fatigue     Depression     Fibromyalgia     GERD (gastroesophageal reflux disease)     Migraines     Pulmonary embolism     Sleep apnea, unspecified     Unspecified osteoarthritis, unspecified site      Past Surgical History:   Procedure Laterality Date    DILATION AND CURETTAGE OF UTERUS      gastric sleeve      Heart monitor loop      HYSTERECTOMY      TUBAL LIGATION       Social History     Socioeconomic History    Marital status:     Tobacco Use    Smoking status: Former     Current packs/day: 0.00     Average packs/day: 0.5 packs/day for 12.0 years (6.0 ttl pk-yrs)     Types: Cigarettes     Start date: 11/3/2008     Quit date: 11/3/2020     Years since quitting: 3.1    Smokeless tobacco: Never    Tobacco comments:     Patient smokes medical marijuana   Substance and Sexual Activity    Alcohol use: Not Currently    Drug use: Yes     Types: Amphetamines, Benzodiazepines, Marijuana     Comment: These are prescriptions    Sexual activity: Not Currently     Partners: Male     Birth control/protection: See Surgical Hx         Current Outpatient Medications:     acetaminophen-codeine 300-30mg (TYLENOL #3) 300-30 mg Tab, Take 1 tablet by mouth every 6 (six) hours as needed., Disp: , Rfl:     albuterol (PROVENTIL/VENTOLIN HFA) 90 mcg/actuation inhaler, Inhale 2 puffs into the lungs every 6 (six) hours as needed for Wheezing. Rescue, Disp: 18 g, Rfl: 11    albuterol (PROVENTIL/VENTOLIN HFA) 90 mcg/actuation inhaler, Inhale into the lungs., Disp: , Rfl:     amitriptyline (ELAVIL) 75 MG tablet, Take 75 mg by mouth every evening., Disp: , Rfl:     atorvastatin (LIPITOR) 40 MG tablet, Take 40 mg by mouth once daily., Disp: , Rfl:     BELSOMRA 10 mg Tab, Take 10 mg by mouth Daily., Disp: , Rfl:     calcium citrate/vitamin D3 (CITRACAL + D ORAL), Take 630 mg by mouth 2 (two) times a day., Disp: , Rfl:     celecoxib (CELEBREX) 100 MG capsule, Take 200 mg by mouth 2 (two) times daily., Disp: , Rfl:     cetirizine (ZYRTEC) 10 mg Cap, Take by mouth., Disp: , Rfl:     clonazePAM (KLONOPIN) 1 MG tablet, Take 1 mg by mouth 3 (three) times daily., Disp: , Rfl:     COQ10, UBIQUINOL, ORAL, Take 100 mg by mouth Daily., Disp: , Rfl:     cyanocobalamin (VITAMIN B-12) 1000 MCG tablet, Take 1,000 mcg by mouth once daily., Disp: , Rfl:     cycloSPORINE (RESTASIS) 0.05 % ophthalmic emulsion, 1 drop 2 (two) times daily., Disp: , Rfl:     desonide (DESOWEN) 0.05 %  cream, Apply topically 2 (two) times daily., Disp: , Rfl:     famotidine (PEPCID) 20 MG tablet, Take 20 mg by mouth 2 (two) times daily., Disp: , Rfl:     guaifenesin/dextromethorphan (ROBITUSSIN-COUGH-CHEST-TIERNEY ORAL), Take by mouth., Disp: , Rfl:     HYDROcodone-acetaminophen (NORCO) 7.5-325 mg per tablet, Take 1 tablet by mouth every 6 (six) hours as needed for Pain., Disp: 12 tablet, Rfl: 0    ipratropium (ATROVENT) 42 mcg (0.06 %) nasal spray, 2 sprays by Nasal route 4 (four) times daily., Disp: , Rfl:     levomefolate calcium (ELFOLATE) 15 mg Tab, Take 15 mg by mouth Daily., Disp: , Rfl:     lisdexamfetamine (VYVANSE) 70 MG capsule, Take 70 mg by mouth every morning., Disp: , Rfl:     loratadine (CLARITIN) 10 mg tablet, Take 10 mg by mouth once daily., Disp: , Rfl:     melatonin 10 mg Tab, Take 10 mg by mouth Daily., Disp: , Rfl:     mupirocin (BACTROBAN) 2 % ointment, Apply topically., Disp: , Rfl:     mv,calcium,min/iron/folic/vitK (ONE-A-DAY WOMEN'S COMPLETE ORAL), Take by mouth Daily., Disp: , Rfl:     nystatin (MYCOSTATIN) cream, Apply topically., Disp: , Rfl:     OXcarbazepine (TRILEPTAL) 300 MG Tab, Take 300 mg by mouth 2 (two) times daily., Disp: , Rfl:     pantoprazole (PROTONIX) 40 MG tablet, Take 40 mg by mouth once daily., Disp: , Rfl:     REXULTI 1 mg Tab, Take 1 mg by mouth Daily., Disp: , Rfl:     tiZANidine (ZANAFLEX) 4 MG tablet, Take 4 mg by mouth Daily., Disp: , Rfl:     tolterodine (DETROL) 2 MG Tab, Take 4 mg by mouth Daily., Disp: , Rfl:     traZODone (DESYREL) 100 MG tablet, Take 200 mg by mouth nightly as needed., Disp: , Rfl:     triamcinolone acetonide 0.1% (KENALOG) 0.1 % cream, Apply topically once as needed., Disp: , Rfl:     TRINTELLIX 20 mg Tab, Take 20 mg by mouth nightly., Disp: , Rfl:     UNABLE TO FIND, Inhale into the lungs every 6 (six) hours. medication name: Prescription Cannabis, Disp: , Rfl:     UNABLE TO FIND, medication name: Sea Vitamin D3 1 capsule 50,000intl  units oral daily, Disp: , Rfl:     valACYclovir (VALTREX) 500 MG tablet, Take 500 mg by mouth 2 (two) times daily. 1 tab po bid for 3 days prn, Disp: , Rfl:     XARELTO 10 mg Tab, Take 10 mg by mouth Daily., Disp: , Rfl:   Review of patient's allergies indicates:   Allergen Reactions    Erythromycin base Diarrhea and Nausea And Vomiting     Stomach cramps    Macrolide antibiotics Diarrhea and Rash     Other reaction(s): diarrhea/vomiting  Makes her sick   Makes her sick          /85 (BP Location: Left arm, Patient Position: Sitting, BP Method: Large (Automatic))   Pulse 88     Comprehensive review of systems completed and negative except as per HPI.        Objective:   Head: Normocephalic, without obvious abnormality, atraumatic  Eyes: conjunctivae/corneas clear. EOM's intact  Ears: normal external appearance  Nose: Nares normal. Septum midline. Mucosa normal. No drainage  Throat: normal findings: lips normal without lesions  Lungs: unlabored breathing on room air  Chest wall: symmetric chest rise  Heart: regular rate and rhythm  Pulses: 2+ and symmetric  Skin: Skin color, texture, turgor normal. No rashes or lesions  Neurologic: Grossly normal    bilateral SHOULDER    Appearance:   normal    Cervical Spine:   No ttp; full, painless ROM; negative Spurlings    Tenderness:   Mild diffuse    AROM:   R , Abduction 160, ER 80, IR L4  L , Abduction 160, ER 80, IR T10    PROM:  same    Pain:  AROM: -  PROM:  -  End ROM: Positive  Supraspinatus strength testing: -  External rotation strength testing: -  Loraine-scapular: -  Virtually all provacative maneuvers -    Strength:  Supraspinatus: intact  External rotation: intact  Loraine-scapular: intact    Provocative Maneuvers:     Rotator Cuff/Biceps/AC Joint  Neer's Sign: Positive  Hawkin's Test: Positive  Painful arc: Positive  Belly Press: Negative  Bear Hugger Test: Negative  Hornblower's Sign: Negative  Speed's Test: Positive  Yergeson's Test: Positive  Cross  Arm Abduction: Positive    Pulses: Palpable radial pulse    Neurological deficits: None    The patient has a warm and well-perfused upper extremity with capillary refill less than 2 seconds. Sensation is intact to light touch in terminal nerve distributions. 5/5 ain/pin/uln. The patient has no palpable epitrochlear lymphadenopathy.      Assessment:     Imaging:   Four view radiographs of the right shoulder previously obtained show no acute fractures or dislocations.  Joint spaces are well maintained. Moderate AC arthritis.  Humeral head remains seated centrally within the glenoid.    Four view radiographs of the left shoulder previously obtained show no acute fractures or dislocations.  Joint spaces are well maintained. Moderate AC arthritis. Humeral head remains seated centrally within the glenoid.    Narrative & Impression  EXAMINATION:  MRI SHOULDER WITHOUT CONTRAST RIGHT     CLINICAL HISTORY:  Shoulder pain, adhesive capsulitis suspected, xray done;  Pain in right shoulder     TECHNIQUE:  Multiplanar multislice images are were performed through the right shoulder.     COMPARISON:  Radiographs dated 09/11/2023     FINDINGS:  Small focus of partial-thickness articular surface tearing of the supraspinatus approximately 1.5 cm from the footplate, with associated tendinosis.  Infraspinatus, teres minor and subscapularis appear intact.  Synovitis is appreciated at the rotator interval.     There is no muscular fatty atrophy.     The long head biceps tendon is intact.     Subacromial spur is appreciated at the insertion of the coracoacromial ligament.  There is no lateral acromial downsloping, os acromiale or significant acromioclavicular osteoarthrosis.     There is  moderate subacromial/subdeltoid bursitis.  No glenohumeral joint effusion is appreciated.     Small sublabral recess is appreciated at the anterior superior glenoid labrum; no definitive labral tear or focal full-thickness cartilage defect is  appreciated.     Marrow signal is normal.  No Hill-Sachs deformity is appreciated.     Impression:     Subacromial spur, subacromial/subdeltoid bursitis and tendinosis and partial-thickness articular surface tearing of the supraspinatus.  Findings may be seen with subacromial impingement syndrome in the appropriate clinical setting.        Electronically signed by: Mike Amato  Date:                                            10/06/2023  Time:                                           09:31    MRI of the right shoulder shows some bursitis and associated tendinosis of the supraspinatus with some partial thickness tearing on the articular surface.  Synovitis within the interval.    Large Joint Aspiration/Injection: bilateral subacromial bursa    Date/Time: 12/20/2023 1:30 PM    Performed by: Emmanuel Escalera MD  Authorized by: Emmanuel Escalera MD    Consent Done?:  Yes (Verbal)  Indications:  Pain  Site marked: the procedure site was marked    Timeout: prior to procedure the correct patient, procedure, and site was verified    Prep: patient was prepped and draped in usual sterile fashion    Local anesthesia used?: No      Details:  Needle Size:  22 G  Ultrasonic Guidance for needle placement?: No    Approach:  Posterior  Location:  Shoulder  Laterality:  Bilateral  Site:  Bilateral subacromial bursa  Medications (Right):  60 mg LIDOcaine HCL 20 mg/ml (2%) 20 mg/mL (2 %); 80 mg methylPREDNISolone acetate 80 mg/mL  Medications (Left):  60 mg LIDOcaine HCL 20 mg/ml (2%) 20 mg/mL (2 %); 80 mg methylPREDNISolone acetate 80 mg/mL  Patient tolerance:  Patient tolerated the procedure well with no immediate complications            1. Rotator cuff tendinitis, unspecified laterality            Plan:          Imaging and exam findings discussed with the patient.  She has bilateral shoulder pain and has improved well with physical therapy.  She does have some lingering pain to her bilateral shoulders.  She has responded well  to injections in the past.  She was interested in repeat injections which were provided as above and she tolerated that well.  She will continue her home exercises for prolonged and sustained shoulder health.  Continue symptomatic treatment.  Avoid aggravating activities.  Follow up as needed.  All of her questions were answered and she was in agreement.

## 2024-01-03 ENCOUNTER — HOSPITAL ENCOUNTER (OUTPATIENT)
Dept: RADIOLOGY | Facility: CLINIC | Age: 54
Discharge: HOME | End: 2024-01-03
Attending: STUDENT IN AN ORGANIZED HEALTH CARE EDUCATION/TRAINING PROGRAM
Payer: MEDICARE

## 2024-01-03 ENCOUNTER — OFFICE VISIT (OUTPATIENT)
Dept: ORTHOPEDICS | Facility: CLINIC | Age: 54
End: 2024-01-03
Payer: MEDICARE

## 2024-01-03 VITALS
HEIGHT: 67 IN | SYSTOLIC BLOOD PRESSURE: 128 MMHG | HEART RATE: 89 BPM | BODY MASS INDEX: 40.49 KG/M2 | WEIGHT: 258 LBS | DIASTOLIC BLOOD PRESSURE: 84 MMHG

## 2024-01-03 DIAGNOSIS — M79.644 PAIN IN FINGER OF RIGHT HAND: ICD-10-CM

## 2024-01-03 DIAGNOSIS — M20.011 MALLET DEFORMITY OF RIGHT MIDDLE FINGER: Primary | ICD-10-CM

## 2024-01-03 PROCEDURE — 99203 OFFICE O/P NEW LOW 30 MIN: CPT | Mod: 57,,, | Performed by: STUDENT IN AN ORGANIZED HEALTH CARE EDUCATION/TRAINING PROGRAM

## 2024-01-03 PROCEDURE — 26432 REPAIR FINGER TENDON: CPT | Mod: F7,,, | Performed by: STUDENT IN AN ORGANIZED HEALTH CARE EDUCATION/TRAINING PROGRAM

## 2024-01-03 PROCEDURE — 73140 X-RAY EXAM OF FINGER(S): CPT | Mod: RT,,, | Performed by: STUDENT IN AN ORGANIZED HEALTH CARE EDUCATION/TRAINING PROGRAM

## 2024-01-03 PROCEDURE — 3044F HG A1C LEVEL LT 7.0%: CPT | Mod: CPTII,,, | Performed by: STUDENT IN AN ORGANIZED HEALTH CARE EDUCATION/TRAINING PROGRAM

## 2024-01-03 PROCEDURE — 3079F DIAST BP 80-89 MM HG: CPT | Mod: CPTII,,, | Performed by: STUDENT IN AN ORGANIZED HEALTH CARE EDUCATION/TRAINING PROGRAM

## 2024-01-03 PROCEDURE — 3008F BODY MASS INDEX DOCD: CPT | Mod: CPTII,,, | Performed by: STUDENT IN AN ORGANIZED HEALTH CARE EDUCATION/TRAINING PROGRAM

## 2024-01-03 PROCEDURE — 1159F MED LIST DOCD IN RCRD: CPT | Mod: CPTII,,, | Performed by: STUDENT IN AN ORGANIZED HEALTH CARE EDUCATION/TRAINING PROGRAM

## 2024-01-03 PROCEDURE — 3074F SYST BP LT 130 MM HG: CPT | Mod: CPTII,,, | Performed by: STUDENT IN AN ORGANIZED HEALTH CARE EDUCATION/TRAINING PROGRAM

## 2024-01-03 NOTE — PROGRESS NOTES
Chief Complaint:  right middle finger injury    Consulting Physician: No ref. provider found    History of present illness:     Patient is a 53-year-old right-hand dominant female who is retired who presents for initial evaluation of her right middle finger injury that she sustained on 12/30/2023.  She states that she jammed her finger as she was cleaning and then noticed that she could not move the tip of her finger.  She went to an urgent care which did not show any fractures.  She does not have any numbness or tingling to the finger.    Past Medical History:   Diagnosis Date    Anxiety     Bipolar affective     Chronic fatigue     Depression     Fibromyalgia     GERD (gastroesophageal reflux disease)     Migraines     Pulmonary embolism     Sleep apnea, unspecified     Unspecified osteoarthritis, unspecified site        Past Surgical History:   Procedure Laterality Date    DILATION AND CURETTAGE OF UTERUS      gastric sleeve      Heart monitor loop      HYSTERECTOMY      TUBAL LIGATION         Current Outpatient Medications   Medication Sig    acetaminophen-codeine 300-30mg (TYLENOL #3) 300-30 mg Tab Take 1 tablet by mouth every 6 (six) hours as needed.    albuterol (PROVENTIL/VENTOLIN HFA) 90 mcg/actuation inhaler Inhale 2 puffs into the lungs every 6 (six) hours as needed for Wheezing. Rescue    albuterol (PROVENTIL/VENTOLIN HFA) 90 mcg/actuation inhaler Inhale into the lungs.    amitriptyline (ELAVIL) 75 MG tablet Take 75 mg by mouth every evening.    atorvastatin (LIPITOR) 40 MG tablet Take 40 mg by mouth once daily.    BELSOMRA 10 mg Tab Take 10 mg by mouth Daily.    calcium citrate/vitamin D3 (CITRACAL + D ORAL) Take 630 mg by mouth 2 (two) times a day.    cetirizine (ZYRTEC) 10 mg Cap Take by mouth.    COQ10, UBIQUINOL, ORAL Take 100 mg by mouth Daily.    cycloSPORINE (RESTASIS) 0.05 % ophthalmic emulsion 1 drop 2 (two) times daily.    famotidine (PEPCID) 20 MG tablet Take 20 mg by mouth 2 (two) times  daily.    ipratropium (ATROVENT) 42 mcg (0.06 %) nasal spray 2 sprays by Nasal route 4 (four) times daily.    levomefolate calcium (ELFOLATE) 15 mg Tab Take 15 mg by mouth Daily.    melatonin 10 mg Tab Take 10 mg by mouth Daily.    mupirocin (BACTROBAN) 2 % ointment Apply topically.    mv,calcium,min/iron/folic/vitK (ONE-A-DAY WOMEN'S COMPLETE ORAL) Take by mouth Daily.    nystatin (MYCOSTATIN) cream Apply topically.    OXcarbazepine (TRILEPTAL) 300 MG Tab Take 300 mg by mouth 2 (two) times daily.    pantoprazole (PROTONIX) 40 MG tablet Take 40 mg by mouth once daily.    REXULTI 1 mg Tab Take 1 mg by mouth Daily.    tiZANidine (ZANAFLEX) 4 MG tablet Take 4 mg by mouth Daily.    traZODone (DESYREL) 100 MG tablet Take 200 mg by mouth nightly as needed.    triamcinolone acetonide 0.1% (KENALOG) 0.1 % cream Apply topically once as needed.    TRINTELLIX 20 mg Tab Take 20 mg by mouth nightly.    UNABLE TO FIND Inhale into the lungs every 6 (six) hours. medication name: Prescription Cannabis    UNABLE TO FIND medication name: Sea Vitamin D3 1 capsule 50,000intl units oral daily    valACYclovir (VALTREX) 500 MG tablet Take 500 mg by mouth 2 (two) times daily. 1 tab po bid for 3 days prn    XARELTO 10 mg Tab Take 10 mg by mouth Daily.    celecoxib (CELEBREX) 100 MG capsule Take 200 mg by mouth 2 (two) times daily.    clonazePAM (KLONOPIN) 1 MG tablet Take 1 mg by mouth 3 (three) times daily.    cyanocobalamin (VITAMIN B-12) 1000 MCG tablet Take 1,000 mcg by mouth once daily.    desonide (DESOWEN) 0.05 % cream Apply topically 2 (two) times daily.    guaifenesin/dextromethorphan (ROBITUSSIN-COUGH-CHEST-TIERNEY ORAL) Take by mouth.    HYDROcodone-acetaminophen (NORCO) 7.5-325 mg per tablet Take 1 tablet by mouth every 6 (six) hours as needed for Pain.    lisdexamfetamine (VYVANSE) 70 MG capsule Take 70 mg by mouth every morning.    loratadine (CLARITIN) 10 mg tablet Take 10 mg by mouth once daily.    tolterodine (DETROL) 2 MG  "Tab Take 4 mg by mouth Daily.     No current facility-administered medications for this visit.       Review of patient's allergies indicates:   Allergen Reactions    Erythromycin base Diarrhea and Nausea And Vomiting     Stomach cramps    Macrolide antibiotics Diarrhea and Rash     Other reaction(s): diarrhea/vomiting  Makes her sick   Makes her sick          Family History   Adopted: Yes   Family history unknown: Yes       Social History     Socioeconomic History    Marital status:    Tobacco Use    Smoking status: Former     Current packs/day: 0.00     Average packs/day: 0.5 packs/day for 12.0 years (6.0 ttl pk-yrs)     Types: Cigarettes     Start date: 11/3/2008     Quit date: 11/3/2020     Years since quitting: 3.1    Smokeless tobacco: Never    Tobacco comments:     Patient smokes medical marijuana   Substance and Sexual Activity    Alcohol use: Not Currently    Drug use: Yes     Types: Amphetamines, Benzodiazepines, Marijuana     Comment: These are prescriptions    Sexual activity: Not Currently     Partners: Male     Birth control/protection: See Surgical Hx       Review of Systems:    Constitution:   Denies chills, fever, and sweats.  HENT:   Denies headaches or blurry vision.  Cardiovascular:  Denies chest pain or irregular heart beat.  Respiratory:   Denies cough or shortness of breath.  Gastrointestinal:  Denies abdominal pain, nausea, or vomiting.  Musculoskeletal:   Denies muscle cramps.  Neurological:   Denies dizziness or focal weakness.  Psychiatric/Behavior: Normal mental status.  Hematology/Lymph:  Denies bleeding problem or easy bruising/bleeding.  Skin:    Denies rash or suspicious lesions.    Examination:    Vital Signs:    Vitals:    01/03/24 1339   BP: 128/84   Pulse: 89   Weight: 117 kg (258 lb)   Height: 5' 7" (1.702 m)       Body mass index is 40.41 kg/m².    Constitution:   Well-developed, well nourished patient in no acute distress.  Neurological:   Alert and oriented x 3 and " cooperative to examination.     Psychiatric/Behavior: Normal mental status.  Respiratory:   No shortness of breath.  Eyes:    Extraoccular muscles intact  Skin:    No scars, rash or suspicious lesions.    MSK:     Right middle finger: No open wounds or rashes.  Tenderness to palpation over the terminal extensor tendon.  She is able to flex actively the D IP joint.  There is a 30 degree extension lag at the D IP joint.  She is able to make a fist but upon extension there was a 30 degree extension lag at the D IP joint.  She is able to actively flex and fully extend at the PIP joint.  There is no swan-neck or boutonniere deformity.  Sensation light touch intact in median ulnar radial distribution.  Radial pulse 2 +hand is warm well perfused    Imaging:    X-ray of the right middle finger two views shows extension lag of the D IP joint     Assessment:  right middle finger soft tissue mallet injury    Plan:    This can be treated conservatively.  I will give her a Stax splint today that she should keep on at all times for the next 8 weeks.  I will see her back in 8 weeks to see how she is doing    Follow Up:  8 weeks  Xray at next visit:  none

## 2024-02-16 ENCOUNTER — HOSPITAL ENCOUNTER (EMERGENCY)
Facility: HOSPITAL | Age: 54
Discharge: HOME | End: 2024-02-16
Attending: FAMILY MEDICINE
Payer: MEDICARE

## 2024-02-16 VITALS
HEIGHT: 67 IN | WEIGHT: 262 LBS | SYSTOLIC BLOOD PRESSURE: 145 MMHG | DIASTOLIC BLOOD PRESSURE: 93 MMHG | RESPIRATION RATE: 18 BRPM | OXYGEN SATURATION: 96 % | HEART RATE: 87 BPM | TEMPERATURE: 99 F | BODY MASS INDEX: 41.12 KG/M2

## 2024-02-16 DIAGNOSIS — M79.671 RIGHT FOOT PAIN: Primary | ICD-10-CM

## 2024-02-16 DIAGNOSIS — R60.9 SWELLING: ICD-10-CM

## 2024-02-16 LAB
ALBUMIN SERPL-MCNC: 3.7 G/DL (ref 3.5–5)
ALBUMIN/GLOB SERPL: 1.2 RATIO (ref 1.1–2)
ALP SERPL-CCNC: 124 UNIT/L (ref 40–150)
ALT SERPL-CCNC: 12 UNIT/L (ref 0–55)
AST SERPL-CCNC: 15 UNIT/L (ref 5–34)
BASOPHILS # BLD AUTO: 0.04 X10(3)/MCL
BASOPHILS NFR BLD AUTO: 1.2 %
BILIRUB SERPL-MCNC: 0.3 MG/DL
BNP BLD-MCNC: 62.3 PG/ML
BUN SERPL-MCNC: 10.7 MG/DL (ref 9.8–20.1)
CALCIUM SERPL-MCNC: 9.3 MG/DL (ref 8.4–10.2)
CHLORIDE SERPL-SCNC: 108 MMOL/L (ref 98–107)
CO2 SERPL-SCNC: 27 MMOL/L (ref 22–29)
CREAT SERPL-MCNC: 0.84 MG/DL (ref 0.55–1.02)
EOSINOPHIL # BLD AUTO: 0.1 X10(3)/MCL (ref 0–0.9)
EOSINOPHIL NFR BLD AUTO: 2.9 %
ERYTHROCYTE [DISTWIDTH] IN BLOOD BY AUTOMATED COUNT: 12.6 % (ref 11.5–17)
GFR SERPLBLD CREATININE-BSD FMLA CKD-EPI: >60 MLS/MIN/1.73/M2
GLOBULIN SER-MCNC: 3.1 GM/DL (ref 2.4–3.5)
GLUCOSE SERPL-MCNC: 113 MG/DL (ref 74–100)
HCT VFR BLD AUTO: 36.9 % (ref 37–47)
HGB BLD-MCNC: 12.1 G/DL (ref 12–16)
IMM GRANULOCYTES # BLD AUTO: 0 X10(3)/MCL (ref 0–0.04)
IMM GRANULOCYTES NFR BLD AUTO: 0 %
LYMPHOCYTES # BLD AUTO: 1.2 X10(3)/MCL (ref 0.6–4.6)
LYMPHOCYTES NFR BLD AUTO: 34.6 %
MAGNESIUM SERPL-MCNC: 1.9 MG/DL (ref 1.6–2.6)
MCH RBC QN AUTO: 30.6 PG (ref 27–31)
MCHC RBC AUTO-ENTMCNC: 32.8 G/DL (ref 33–36)
MCV RBC AUTO: 93.2 FL (ref 80–94)
MONOCYTES # BLD AUTO: 0.48 X10(3)/MCL (ref 0.1–1.3)
MONOCYTES NFR BLD AUTO: 13.8 %
NEUTROPHILS # BLD AUTO: 1.65 X10(3)/MCL (ref 2.1–9.2)
NEUTROPHILS NFR BLD AUTO: 47.5 %
NRBC BLD AUTO-RTO: 0 %
PLATELET # BLD AUTO: 283 X10(3)/MCL (ref 130–400)
PMV BLD AUTO: 9.9 FL (ref 7.4–10.4)
POTASSIUM SERPL-SCNC: 3.6 MMOL/L (ref 3.5–5.1)
PROT SERPL-MCNC: 6.8 GM/DL (ref 6.4–8.3)
RBC # BLD AUTO: 3.96 X10(6)/MCL (ref 4.2–5.4)
SODIUM SERPL-SCNC: 142 MMOL/L (ref 136–145)
WBC # SPEC AUTO: 3.47 X10(3)/MCL (ref 4.5–11.5)

## 2024-02-16 PROCEDURE — 85025 COMPLETE CBC W/AUTO DIFF WBC: CPT | Performed by: FAMILY MEDICINE

## 2024-02-16 PROCEDURE — 80053 COMPREHEN METABOLIC PANEL: CPT | Performed by: FAMILY MEDICINE

## 2024-02-16 PROCEDURE — 83735 ASSAY OF MAGNESIUM: CPT | Performed by: FAMILY MEDICINE

## 2024-02-16 PROCEDURE — 99284 EMERGENCY DEPT VISIT MOD MDM: CPT | Mod: 25

## 2024-02-16 PROCEDURE — 25000003 PHARM REV CODE 250: Performed by: FAMILY MEDICINE

## 2024-02-16 PROCEDURE — 83880 ASSAY OF NATRIURETIC PEPTIDE: CPT | Performed by: FAMILY MEDICINE

## 2024-02-16 RX ORDER — TRAMADOL HYDROCHLORIDE 50 MG/1
50 TABLET ORAL EVERY 6 HOURS PRN
Qty: 12 TABLET | Refills: 0 | Status: SHIPPED | OUTPATIENT
Start: 2024-02-16 | End: 2024-04-01

## 2024-02-16 RX ORDER — TRAMADOL HYDROCHLORIDE 50 MG/1
50 TABLET ORAL
Status: COMPLETED | OUTPATIENT
Start: 2024-02-16 | End: 2024-02-16

## 2024-02-16 RX ADMIN — TRAMADOL HYDROCHLORIDE 50 MG: 50 TABLET, COATED ORAL at 12:02

## 2024-02-16 NOTE — ED PROVIDER NOTES
Encounter Date: 2/16/2024       History     Chief Complaint   Patient presents with    Leg Swelling     C/o brooke foot swelling and knee pain X a few days.      54-year-old female with history of anxiety, bipolar disorder, depression, fibromyalgia, GERD, migraines, pulmonary embolism, and sleep apnea who presents to the ED for bilateral foot swelling and pain to right foot.  Reports she recently broke her left little toe.  States she has been compensating on her right side.  Reports no change in skin color.  No swelling of calf muscles and no calf pain.  Reports she is ambulating with a cane.  No new injury or trauma to right foot.  No fever, chills, chest pain, shortness of breath, abdominal pain, nausea, vomiting, or dysuria.    The history is provided by the patient. No  was used.     Review of patient's allergies indicates:   Allergen Reactions    Erythromycin base Diarrhea and Nausea And Vomiting     Stomach cramps    Macrolide antibiotics Diarrhea, Rash and Nausea And Vomiting     Other reaction(s): diarrhea/vomiting    Makes her sick     Makes her sick    Makes her sick    Other reaction(s): diarrhea/vomiting   Makes her sick    Makes her sick    Stomach cramps     Past Medical History:   Diagnosis Date    Anxiety     Bipolar affective     Chronic fatigue     Depression     Fibromyalgia     GERD (gastroesophageal reflux disease)     Migraines     Pulmonary embolism     Sleep apnea, unspecified     Unspecified osteoarthritis, unspecified site      Past Surgical History:   Procedure Laterality Date    DILATION AND CURETTAGE OF UTERUS      gastric sleeve      Heart monitor loop      HYSTERECTOMY      TUBAL LIGATION       Family History   Adopted: Yes   Family history unknown: Yes     Social History     Tobacco Use    Smoking status: Former     Current packs/day: 0.00     Average packs/day: 0.5 packs/day for 12.0 years (6.0 ttl pk-yrs)     Types: Cigarettes     Start date: 11/3/2008     Quit  date: 11/3/2020     Years since quitting: 3.2    Smokeless tobacco: Never    Tobacco comments:     Patient smokes medical marijuana   Substance Use Topics    Alcohol use: Not Currently    Drug use: Yes     Types: Amphetamines, Benzodiazepines, Marijuana     Comment: These are prescriptions     Review of Systems   Musculoskeletal:         Right foot pain, bilateral foot swelling   All other systems reviewed and are negative.      Physical Exam     Initial Vitals [02/16/24 1117]   BP Pulse Resp Temp SpO2   (!) 145/93 87 18 98.6 °F (37 °C) 96 %      MAP       --         Physical Exam    Nursing note and vitals reviewed.  Constitutional: She appears well-developed and well-nourished. No distress.   obese   HENT:   Head: Normocephalic and atraumatic.   Eyes: Conjunctivae and EOM are normal. Pupils are equal, round, and reactive to light.   Neck: Neck supple.   Normal range of motion.  Cardiovascular:  Normal rate, regular rhythm, normal heart sounds and intact distal pulses.           No murmur heard.  Pulmonary/Chest: Breath sounds normal. No respiratory distress. She has no wheezes. She has no rhonchi. She has no rales.   Abdominal: Abdomen is soft. Bowel sounds are normal. She exhibits no distension. There is no abdominal tenderness. There is no rebound and no guarding.   Musculoskeletal:         General: Normal range of motion.      Cervical back: Normal range of motion and neck supple.      Right foot: Normal range of motion and normal capillary refill. No swelling, deformity, bunion, Charcot foot, foot drop, prominent metatarsal heads, laceration, tenderness, bony tenderness or crepitus. Normal pulse.      Left foot: Normal range of motion and normal capillary refill. Bony tenderness present. No swelling, deformity, bunion, Charcot foot, foot drop, prominent metatarsal heads, laceration or crepitus. Normal pulse.      Comments: DVT exam negative bilaterally, calf muscles equal circumference and nontender      Neurological: She is alert and oriented to person, place, and time. GCS score is 15. GCS eye subscore is 4. GCS verbal subscore is 5. GCS motor subscore is 6.   Skin: Skin is warm and dry. Capillary refill takes less than 2 seconds.         ED Course   Procedures  Labs Reviewed   COMPREHENSIVE METABOLIC PANEL - Abnormal; Notable for the following components:       Result Value    Chloride 108 (*)     Glucose Level 113 (*)     All other components within normal limits   CBC WITH DIFFERENTIAL - Abnormal; Notable for the following components:    WBC 3.47 (*)     RBC 3.96 (*)     Hct 36.9 (*)     MCHC 32.8 (*)     Neut # 1.65 (*)     All other components within normal limits   B-TYPE NATRIURETIC PEPTIDE - Normal   MAGNESIUM - Normal   CBC W/ AUTO DIFFERENTIAL    Narrative:     The following orders were created for panel order CBC auto differential.  Procedure                               Abnormality         Status                     ---------                               -----------         ------                     CBC with Differential[[id number]]       Abnormal            Final result                 Please view results for these tests on the individual orders.          Imaging Results              X-Ray Foot Complete Right (Final result)  Result time 02/16/24 13:42:54      Final result by Beverley Coulter MD (02/16/24 13:42:54)                   Impression:      No acute osseous abnormality.      Electronically signed by: Beverley Coulter  Date:    02/16/2024  Time:    13:42               Narrative:    EXAMINATION:  XR FOOT COMPLETE 3 VIEW RIGHT    CLINICAL HISTORY:  . Pain in right foot    TECHNIQUE:  AP, lateral, and oblique views of the right foot were performed.    COMPARISON:  Right ankle radiographs 10/06/2015    FINDINGS:  No fracture.  No stress reaction.  No dislocation. Plantar calcaneal enthesophyte.    Mild forefoot soft tissue swelling.                                       X-Ray Chest AP  Portable (Final result)  Result time 02/16/24 12:06:23      Final result by Teto Leyva MD (02/16/24 12:06:23)                   Impression:      No acute chest disease is identified.      Electronically signed by: Teto Leyva  Date:    02/16/2024  Time:    12:06               Narrative:    EXAMINATION:  XR CHEST AP PORTABLE    CLINICAL HISTORY:  leg swelling;, .    COMPARISON:  January 17, 2023    FINDINGS:  No alveolar consolidation, effusion, or pneumothorax is seen.   The thoracic aorta is normal  cardiac silhouette, central pulmonary vessels and mediastinum are normal in size and are grossly unremarkable.   visualized osseous structures are grossly unremarkable.                                       Medications   traMADoL tablet 50 mg (50 mg Oral Given 2/16/24 1209)     Medical Decision Making  54-year-old female who presents to the ED for bilateral feet swelling and pain to right foot.  Symptoms started after breaking her left little toe.  States she is compensating on her right side.  Differential diagnoses including but not limited to arthritis, contusion, sprain, fracture, CHF, localized edema.    Amount and/or Complexity of Data Reviewed  Labs: ordered. Decision-making details documented in ED Course.  Radiology: ordered. Decision-making details documented in ED Course.    Risk  Prescription drug management.               ED Course as of 02/16/24 1410   Fri Feb 16, 2024   1405 Reviewed labs and imaging with patient.  BMP within normal limits.  No appreciable swelling to lower extremities.  No acute fracture of right foot.  Prescription for tramadol on discharge.  Ice to affected areas.  Elevate legs when at home.  Follow up with PCP outpatient.  Patient voiced understanding and agreement with plan to discharge home. [ROSALIE]      ED Course User Index  [ROSALIE] Rob Aparicio MD                           Clinical Impression:  Final diagnoses:  [M79.671] Right foot pain (Primary)  [R60.9] Swelling           ED Disposition Condition    Discharge Stable          ED Prescriptions       Medication Sig Dispense Start Date End Date Auth. Provider    traMADoL (ULTRAM) 50 mg tablet Take 1 tablet (50 mg total) by mouth every 6 (six) hours as needed for Pain. 12 tablet 2/16/2024 -- Rob Aparicio MD          Follow-up Information       Follow up With Specialties Details Why Contact Info    Guadalupe Ta MD Family Medicine In 3 days  [address] [facility] [address]  [phone]               Rob Aparicio MD  02/16/24 5508

## 2024-02-26 ENCOUNTER — OFFICE VISIT (OUTPATIENT)
Dept: ORTHOPEDICS | Facility: CLINIC | Age: 54
End: 2024-02-26
Payer: MEDICARE

## 2024-02-26 ENCOUNTER — HOSPITAL ENCOUNTER (OUTPATIENT)
Dept: RADIOLOGY | Facility: CLINIC | Age: 54
Discharge: HOME | End: 2024-02-26
Attending: REHABILITATION UNIT
Payer: MEDICARE

## 2024-02-26 ENCOUNTER — PATIENT MESSAGE (OUTPATIENT)
Dept: ORTHOPEDICS | Facility: CLINIC | Age: 54
End: 2024-02-26

## 2024-02-26 VITALS
SYSTOLIC BLOOD PRESSURE: 115 MMHG | WEIGHT: 254.38 LBS | DIASTOLIC BLOOD PRESSURE: 84 MMHG | HEIGHT: 67 IN | BODY MASS INDEX: 39.92 KG/M2 | HEART RATE: 82 BPM

## 2024-02-26 DIAGNOSIS — M25.561 RIGHT KNEE PAIN, UNSPECIFIED CHRONICITY: Primary | ICD-10-CM

## 2024-02-26 DIAGNOSIS — M25.561 RIGHT KNEE PAIN, UNSPECIFIED CHRONICITY: ICD-10-CM

## 2024-02-26 PROCEDURE — 73564 X-RAY EXAM KNEE 4 OR MORE: CPT | Mod: RT,,, | Performed by: REHABILITATION UNIT

## 2024-02-26 PROCEDURE — 3074F SYST BP LT 130 MM HG: CPT | Mod: CPTII,,, | Performed by: REHABILITATION UNIT

## 2024-02-26 PROCEDURE — 3044F HG A1C LEVEL LT 7.0%: CPT | Mod: CPTII,,, | Performed by: REHABILITATION UNIT

## 2024-02-26 PROCEDURE — 20610 DRAIN/INJ JOINT/BURSA W/O US: CPT | Mod: RT,,, | Performed by: REHABILITATION UNIT

## 2024-02-26 PROCEDURE — 3008F BODY MASS INDEX DOCD: CPT | Mod: CPTII,,, | Performed by: REHABILITATION UNIT

## 2024-02-26 PROCEDURE — 99213 OFFICE O/P EST LOW 20 MIN: CPT | Mod: 25,,, | Performed by: REHABILITATION UNIT

## 2024-02-26 PROCEDURE — 1159F MED LIST DOCD IN RCRD: CPT | Mod: CPTII,,, | Performed by: REHABILITATION UNIT

## 2024-02-26 PROCEDURE — 3079F DIAST BP 80-89 MM HG: CPT | Mod: CPTII,,, | Performed by: REHABILITATION UNIT

## 2024-02-26 RX ADMIN — LIDOCAINE HYDROCHLORIDE 60 MG: 20 INJECTION, SOLUTION INFILTRATION; PERINEURAL at 03:02

## 2024-02-26 RX ADMIN — METHYLPREDNISOLONE ACETATE 80 MG: 80 INJECTION, SUSPENSION INTRA-ARTICULAR; INTRALESIONAL; INTRAMUSCULAR; SOFT TISSUE at 03:02

## 2024-02-26 NOTE — PROGRESS NOTES
Subjective:      Patient ID: Mini Chappell is a 54 y.o. female.    Chief Complaint: Knee Pain (R knee states her she was getting into bed and twisted her knee. States her pain has been increasing. Ongoing since Jan 2024/ reports stiffness- locates her pain in the medial aspect.)    HPI:   Mini Chappell is a 54 y.o. female who presents today for initial evaluation of her right knee.  She reports that around 2 months ago she was getting into bed when she twisted her knee.  Pain has been progressive.  No sensory or motor changes distally.  She has pain to the medial aspect of her knee.  She is some associated swelling and stiffness.  No gross instability or mechanical symptoms.  No therapy or injections to date.    Past Medical History:   Diagnosis Date    Anxiety     Bipolar affective     Chronic fatigue     Depression     Fibromyalgia     GERD (gastroesophageal reflux disease)     Migraines     Pulmonary embolism     Sleep apnea, unspecified     Unspecified osteoarthritis, unspecified site      Past Surgical History:   Procedure Laterality Date    DILATION AND CURETTAGE OF UTERUS      gastric sleeve      Heart monitor loop      HYSTERECTOMY      TUBAL LIGATION       Social History     Socioeconomic History    Marital status:    Tobacco Use    Smoking status: Former     Current packs/day: 0.00     Average packs/day: 0.5 packs/day for 12.0 years (6.0 ttl pk-yrs)     Types: Cigarettes     Start date: 11/3/2008     Quit date: 11/3/2020     Years since quitting: 3.3    Smokeless tobacco: Never    Tobacco comments:     Patient smokes medical marijuana   Substance and Sexual Activity    Alcohol use: Not Currently    Drug use: Yes     Types: Amphetamines, Benzodiazepines, Marijuana     Comment: These are prescriptions    Sexual activity: Not Currently     Partners: Male     Birth control/protection: See Surgical Hx         Current Outpatient Medications:     albuterol (PROVENTIL/VENTOLIN HFA) 90 mcg/actuation  inhaler, Inhale 2 puffs into the lungs every 6 (six) hours as needed for Wheezing. Rescue, Disp: 18 g, Rfl: 11    albuterol (PROVENTIL/VENTOLIN HFA) 90 mcg/actuation inhaler, Inhale into the lungs., Disp: , Rfl:     amitriptyline (ELAVIL) 75 MG tablet, Take 75 mg by mouth every evening., Disp: , Rfl:     atorvastatin (LIPITOR) 40 MG tablet, Take 40 mg by mouth once daily., Disp: , Rfl:     BELSOMRA 10 mg Tab, Take 10 mg by mouth Daily., Disp: , Rfl:     calcium citrate/vitamin D3 (CITRACAL + D ORAL), Take 630 mg by mouth 2 (two) times a day., Disp: , Rfl:     cetirizine (ZYRTEC) 10 mg Cap, Take by mouth., Disp: , Rfl:     COQ10, UBIQUINOL, ORAL, Take 100 mg by mouth Daily., Disp: , Rfl:     cycloSPORINE (RESTASIS) 0.05 % ophthalmic emulsion, 1 drop 2 (two) times daily., Disp: , Rfl:     famotidine (PEPCID) 20 MG tablet, Take 20 mg by mouth 2 (two) times daily., Disp: , Rfl:     ipratropium (ATROVENT) 42 mcg (0.06 %) nasal spray, 2 sprays by Nasal route 4 (four) times daily., Disp: , Rfl:     levomefolate calcium (ELFOLATE) 15 mg Tab, Take 15 mg by mouth Daily., Disp: , Rfl:     melatonin 10 mg Tab, Take 10 mg by mouth Daily., Disp: , Rfl:     mupirocin (BACTROBAN) 2 % ointment, Apply topically., Disp: , Rfl:     mv,calcium,min/iron/folic/vitK (ONE-A-DAY WOMEN'S COMPLETE ORAL), Take by mouth Daily., Disp: , Rfl:     nystatin (MYCOSTATIN) cream, Apply topically., Disp: , Rfl:     OXcarbazepine (TRILEPTAL) 300 MG Tab, Take 300 mg by mouth 2 (two) times daily., Disp: , Rfl:     pantoprazole (PROTONIX) 40 MG tablet, Take 40 mg by mouth once daily., Disp: , Rfl:     REXULTI 1 mg Tab, Take 1 mg by mouth Daily., Disp: , Rfl:     tiZANidine (ZANAFLEX) 4 MG tablet, Take 4 mg by mouth Daily., Disp: , Rfl:     traMADoL (ULTRAM) 50 mg tablet, Take 1 tablet (50 mg total) by mouth every 6 (six) hours as needed for Pain., Disp: 12 tablet, Rfl: 0    traZODone (DESYREL) 100 MG tablet, Take 200 mg by mouth nightly as needed., Disp: ,  "Rfl:     triamcinolone acetonide 0.1% (KENALOG) 0.1 % cream, Apply topically once as needed., Disp: , Rfl:     TRINTELLIX 20 mg Tab, Take 20 mg by mouth nightly., Disp: , Rfl:     UNABLE TO FIND, Inhale into the lungs every 6 (six) hours. medication name: Prescription Cannabis, Disp: , Rfl:     UNABLE TO FIND, medication name: Sea Vitamin D3 1 capsule 50,000intl units oral daily, Disp: , Rfl:     valACYclovir (VALTREX) 500 MG tablet, Take 500 mg by mouth 2 (two) times daily. 1 tab po bid for 3 days prn, Disp: , Rfl:     XARELTO 10 mg Tab, Take 10 mg by mouth Daily., Disp: , Rfl:     celecoxib (CELEBREX) 100 MG capsule, Take 200 mg by mouth 2 (two) times daily., Disp: , Rfl:     clonazePAM (KLONOPIN) 1 MG tablet, Take 1 mg by mouth 3 (three) times daily., Disp: , Rfl:     cyanocobalamin (VITAMIN B-12) 1000 MCG tablet, Take 1,000 mcg by mouth once daily., Disp: , Rfl:     desonide (DESOWEN) 0.05 % cream, Apply topically 2 (two) times daily., Disp: , Rfl:     guaifenesin/dextromethorphan (ROBITUSSIN-COUGH-CHEST-TIERNEY ORAL), Take by mouth., Disp: , Rfl:     lisdexamfetamine (VYVANSE) 70 MG capsule, Take 70 mg by mouth every morning., Disp: , Rfl:     loratadine (CLARITIN) 10 mg tablet, Take 10 mg by mouth once daily., Disp: , Rfl:     tolterodine (DETROL) 2 MG Tab, Take 4 mg by mouth Daily., Disp: , Rfl:   Review of patient's allergies indicates:   Allergen Reactions    Erythromycin base Diarrhea and Nausea And Vomiting     Stomach cramps    Macrolide antibiotics Diarrhea, Rash and Nausea And Vomiting     Other reaction(s): diarrhea/vomiting    Makes her sick     Makes her sick    Makes her sick    Other reaction(s): diarrhea/vomiting   Makes her sick    Makes her sick    Stomach cramps       /84   Pulse 82   Ht 5' 7" (1.702 m)   Wt 115.4 kg (254 lb 6.4 oz)   BMI 39.84 kg/m²     Comprehensive review of systems completed and negative except as per HPI.        Objective:   Head: Normocephalic, without " obvious abnormality, atraumatic  Eyes: conjunctivae/corneas clear. EOM's intact  Ears: normal external appearance  Nose: Nares normal. Septum midline. Mucosa normal. No drainage  Throat: normal findings: lips normal without lesions  Lungs: unlabored breathing on room air  Chest wall: symmetric chest rise  Heart: regular rate and rhythm  Pulses: 2+ and symmetric  Skin: Skin color, texture, turgor normal. No rashes or lesions  Neurologic: Grossly normal    right KNEE:     Alignment: neutral  Appearance: skin in intact without lesion  Effusion: small  Gait: antalgic  Straight Leg Raise: negative  Log Roll: negative  Hip ROM: full and painless  Ankle ROM: full and painless   Knee ROM:  0 - 120  Tenderness: TTP to the medial joint line   Patellar Mobility: normal  Patellar grind: negative  PF Crepitus: negative        Vignesh Test: negative   Valgus Stress @ 0 deg: stable  Valgus Stress @ 30 deg: stable  Varus Stress @ 0 deg: stable  Varus Stress @ 30 deg: stable  Lachman: stable  Ant Drawer: negative   Post Drawer: negative  Posterior Sag Sign: negative  Neurological deficits: SILT dp/sp/t distributions  Motor: 5/5 EHL/FHL/TA/GS    Warm well perfused lower extremity with capillary refill less than 2 seconds and sensation intact to light touch in the terminal nerve distributions. Calf soft and easily compressible without clinical sign of DVT. No palpable popliteal lymphadenopathy    Assessment:     Imaging:   Four view weight bearing radiographs of the right knee obtained today personally reviewed showing no acute fractures or dislocations.  Mild to moderate tricompartmental degenerative changes most severe in the medial and patellofemoral compartments with joint space narrowing and small osteophyte formation.  No gross malalignment.     Large Joint Aspiration/Injection: R knee    Date/Time: 2/26/2024 3:00 PM    Performed by: Emmanuel Escalera MD  Authorized by: Emmanuel Escalera MD    Consent Done?:  Yes  (Verbal)  Indications:  Arthritis and pain  Site marked: the procedure site was marked    Timeout: prior to procedure the correct patient, procedure, and site was verified    Prep: patient was prepped and draped in usual sterile fashion    Local anesthesia used?: No      Details:  Needle Size:  22 G  Ultrasonic Guidance for needle placement?: No    Approach:  Anterolateral  Location:  Knee  Site:  R knee  Medications:  60 mg LIDOcaine HCL 20 mg/ml (2%) 20 mg/mL (2 %); 80 mg methylPREDNISolone acetate 80 mg/mL  Patient tolerance:  Patient tolerated the procedure well with no immediate complications        1. Right knee pain, unspecified chronicity          Plan:       Orders Placed This Encounter    Large Joint Aspiration/Injection    HME - OTHER    X-Ray Knee Complete 4 Or More Views Right    Ambulatory referral/consult to Physical/Occupational Therapy     Imaging and exam findings discussed.  She sustained acute right knee injury.  She localizes medially.  She is ligamentously stable on exam.  She has some degenerative changes on her radiographs.  We discussed her options.  We will start with a steroid injection which was provided as above and she tolerated well.  Post-injection care was discussed.  We will also start some physical therapy.  I am going to see her back on an as-needed basis.  Symptomatic treatment with over-the-counter anti-inflammatories as needed and medically able.  Avoid aggravating activities.  If she has further as she has been pain from MRI to evaluate the soft tissue structures of her knee center center meniscus.  Questions were answered and she was in agreement.

## 2024-02-29 RX ORDER — METHYLPREDNISOLONE ACETATE 80 MG/ML
80 INJECTION, SUSPENSION INTRA-ARTICULAR; INTRALESIONAL; INTRAMUSCULAR; SOFT TISSUE
Status: DISCONTINUED | OUTPATIENT
Start: 2024-02-26 | End: 2024-02-29 | Stop reason: HOSPADM

## 2024-02-29 RX ORDER — LIDOCAINE HYDROCHLORIDE 20 MG/ML
60 INJECTION, SOLUTION INFILTRATION; PERINEURAL
Status: DISCONTINUED | OUTPATIENT
Start: 2024-02-26 | End: 2024-02-29 | Stop reason: HOSPADM

## 2024-03-04 ENCOUNTER — OFFICE VISIT (OUTPATIENT)
Dept: ORTHOPEDICS | Facility: CLINIC | Age: 54
End: 2024-03-04
Payer: MEDICARE

## 2024-03-04 VITALS
SYSTOLIC BLOOD PRESSURE: 130 MMHG | DIASTOLIC BLOOD PRESSURE: 86 MMHG | HEART RATE: 78 BPM | BODY MASS INDEX: 40.02 KG/M2 | HEIGHT: 67 IN | WEIGHT: 255 LBS

## 2024-03-04 DIAGNOSIS — M20.011 MALLET DEFORMITY OF RIGHT MIDDLE FINGER: Primary | ICD-10-CM

## 2024-03-04 PROCEDURE — 1159F MED LIST DOCD IN RCRD: CPT | Mod: CPTII,,, | Performed by: STUDENT IN AN ORGANIZED HEALTH CARE EDUCATION/TRAINING PROGRAM

## 2024-03-04 PROCEDURE — 3008F BODY MASS INDEX DOCD: CPT | Mod: CPTII,,, | Performed by: STUDENT IN AN ORGANIZED HEALTH CARE EDUCATION/TRAINING PROGRAM

## 2024-03-04 PROCEDURE — 3075F SYST BP GE 130 - 139MM HG: CPT | Mod: CPTII,,, | Performed by: STUDENT IN AN ORGANIZED HEALTH CARE EDUCATION/TRAINING PROGRAM

## 2024-03-04 PROCEDURE — 99024 POSTOP FOLLOW-UP VISIT: CPT | Mod: ,,, | Performed by: STUDENT IN AN ORGANIZED HEALTH CARE EDUCATION/TRAINING PROGRAM

## 2024-03-04 PROCEDURE — 3044F HG A1C LEVEL LT 7.0%: CPT | Mod: CPTII,,, | Performed by: STUDENT IN AN ORGANIZED HEALTH CARE EDUCATION/TRAINING PROGRAM

## 2024-03-04 PROCEDURE — 3079F DIAST BP 80-89 MM HG: CPT | Mod: CPTII,,, | Performed by: STUDENT IN AN ORGANIZED HEALTH CARE EDUCATION/TRAINING PROGRAM

## 2024-03-04 NOTE — PROGRESS NOTES
Chief Complaint:  right middle finger injury    Consulting Physician: No ref. provider found    History of present illness:     Patient is a 53-year-old right-hand dominant female who is retired who presents for follow up evaluation of her right middle finger injury that she sustained on 12/30/2023.  I saw her in my clinic last time where I diagnosed her with a right middle finger soft tissue mallet injury which we treated conservatively with a Stax splint.  She has done a good job keeping this on at all times.      Past Medical History:   Diagnosis Date    Anxiety     Bipolar affective     Chronic fatigue     Depression     Fibromyalgia     GERD (gastroesophageal reflux disease)     Migraines     Pulmonary embolism     Sleep apnea, unspecified     Unspecified osteoarthritis, unspecified site        Past Surgical History:   Procedure Laterality Date    DILATION AND CURETTAGE OF UTERUS      gastric sleeve      Heart monitor loop      HYSTERECTOMY      TUBAL LIGATION         Current Outpatient Medications   Medication Sig    albuterol (PROVENTIL/VENTOLIN HFA) 90 mcg/actuation inhaler Inhale 2 puffs into the lungs every 6 (six) hours as needed for Wheezing. Rescue    albuterol (PROVENTIL/VENTOLIN HFA) 90 mcg/actuation inhaler Inhale into the lungs.    amitriptyline (ELAVIL) 75 MG tablet Take 75 mg by mouth every evening.    atorvastatin (LIPITOR) 40 MG tablet Take 40 mg by mouth once daily.    BELSOMRA 10 mg Tab Take 10 mg by mouth Daily.    calcium citrate/vitamin D3 (CITRACAL + D ORAL) Take 630 mg by mouth 2 (two) times a day.    cetirizine (ZYRTEC) 10 mg Cap Take by mouth.    COQ10, UBIQUINOL, ORAL Take 100 mg by mouth Daily.    cycloSPORINE (RESTASIS) 0.05 % ophthalmic emulsion 1 drop 2 (two) times daily.    famotidine (PEPCID) 20 MG tablet Take 20 mg by mouth 2 (two) times daily.    ipratropium (ATROVENT) 42 mcg (0.06 %) nasal spray 2 sprays by Nasal route 4 (four) times daily.    levomefolate calcium (ELFOLATE)  15 mg Tab Take 15 mg by mouth Daily.    melatonin 10 mg Tab Take 10 mg by mouth Daily.    mupirocin (BACTROBAN) 2 % ointment Apply topically.    mv,calcium,min/iron/folic/vitK (ONE-A-DAY WOMEN'S COMPLETE ORAL) Take by mouth Daily.    nystatin (MYCOSTATIN) cream Apply topically.    OXcarbazepine (TRILEPTAL) 300 MG Tab Take 300 mg by mouth 2 (two) times daily.    pantoprazole (PROTONIX) 40 MG tablet Take 40 mg by mouth once daily.    REXULTI 1 mg Tab Take 1 mg by mouth Daily.    tiZANidine (ZANAFLEX) 4 MG tablet Take 4 mg by mouth Daily.    traMADoL (ULTRAM) 50 mg tablet Take 1 tablet (50 mg total) by mouth every 6 (six) hours as needed for Pain.    traZODone (DESYREL) 100 MG tablet Take 200 mg by mouth nightly as needed.    triamcinolone acetonide 0.1% (KENALOG) 0.1 % cream Apply topically once as needed.    TRINTELLIX 20 mg Tab Take 20 mg by mouth nightly.    UNABLE TO FIND Inhale into the lungs every 6 (six) hours. medication name: Prescription Cannabis    UNABLE TO FIND medication name: Sea Vitamin D3 1 capsule 50,000intl units oral daily    valACYclovir (VALTREX) 500 MG tablet Take 500 mg by mouth 2 (two) times daily. 1 tab po bid for 3 days prn    XARELTO 10 mg Tab Take 10 mg by mouth Daily.    celecoxib (CELEBREX) 100 MG capsule Take 200 mg by mouth 2 (two) times daily.    clonazePAM (KLONOPIN) 1 MG tablet Take 1 mg by mouth 3 (three) times daily.    cyanocobalamin (VITAMIN B-12) 1000 MCG tablet Take 1,000 mcg by mouth once daily.    desonide (DESOWEN) 0.05 % cream Apply topically 2 (two) times daily.    guaifenesin/dextromethorphan (ROBITUSSIN-COUGH-CHEST-TIERNEY ORAL) Take by mouth.    lisdexamfetamine (VYVANSE) 70 MG capsule Take 70 mg by mouth every morning.    loratadine (CLARITIN) 10 mg tablet Take 10 mg by mouth once daily.    tolterodine (DETROL) 2 MG Tab Take 4 mg by mouth Daily.     No current facility-administered medications for this visit.       Review of patient's allergies indicates:   Allergen  "Reactions    Erythromycin base Diarrhea and Nausea And Vomiting     Stomach cramps    Macrolide antibiotics Diarrhea, Rash and Nausea And Vomiting     Other reaction(s): diarrhea/vomiting    Makes her sick     Makes her sick    Makes her sick    Other reaction(s): diarrhea/vomiting   Makes her sick    Makes her sick    Stomach cramps       Family History   Adopted: Yes   Family history unknown: Yes       Social History     Socioeconomic History    Marital status:    Tobacco Use    Smoking status: Former     Current packs/day: 0.00     Average packs/day: 0.5 packs/day for 12.0 years (6.0 ttl pk-yrs)     Types: Cigarettes     Start date: 11/3/2008     Quit date: 11/3/2020     Years since quitting: 3.3    Smokeless tobacco: Never    Tobacco comments:     Patient smokes medical marijuana   Substance and Sexual Activity    Alcohol use: Not Currently    Drug use: Yes     Types: Amphetamines, Benzodiazepines, Marijuana     Comment: These are prescriptions    Sexual activity: Not Currently     Partners: Male     Birth control/protection: See Surgical Hx       Review of Systems:    Constitution:   Denies chills, fever, and sweats.  HENT:   Denies headaches or blurry vision.  Cardiovascular:  Denies chest pain or irregular heart beat.  Respiratory:   Denies cough or shortness of breath.  Gastrointestinal:  Denies abdominal pain, nausea, or vomiting.  Musculoskeletal:   Denies muscle cramps.  Neurological:   Denies dizziness or focal weakness.  Psychiatric/Behavior: Normal mental status.  Hematology/Lymph:  Denies bleeding problem or easy bruising/bleeding.  Skin:    Denies rash or suspicious lesions.    Examination:    Vital Signs:    Vitals:    03/04/24 0946   BP: 130/86   Pulse: 78   Weight: 115.7 kg (255 lb)   Height: 5' 7" (1.702 m)       Body mass index is 39.94 kg/m².    Constitution:   Well-developed, well nourished patient in no acute distress.  Neurological:   Alert and oriented x 3 and cooperative to " examination.     Psychiatric/Behavior: Normal mental status.  Respiratory:   No shortness of breath.  Eyes:    Extraoccular muscles intact  Skin:    No scars, rash or suspicious lesions.    MSK:     Right middle finger: No open wounds or rashes.  Tenderness to palpation over the terminal extensor tendon.  She is able to flex actively the D IP joint.  There has no more extension lag at the D IP joint.  She is able to actively flex and fully extend at the PIP joint.  There is no swan-neck or boutonniere deformity.  Sensation light touch intact in median ulnar radial distribution.  Radial pulse 2 +hand is warm well perfused    Imaging:   none     Assessment:  right middle finger soft tissue mallet injury    Plan:    She has been doing good job keeping her splint on at all times.  She no longer has an extension lag.  She can now start gently working on range of motion actively of the middle finger.  I will see her back in 4-6 weeks to make sure she has regained her motion    Follow Up:  6 weeks  Xray at next visit:  none

## 2024-03-18 ENCOUNTER — HOSPITAL ENCOUNTER (OUTPATIENT)
Dept: RADIOLOGY | Facility: CLINIC | Age: 54
Discharge: HOME | End: 2024-03-18
Attending: REHABILITATION UNIT
Payer: MEDICARE

## 2024-03-18 ENCOUNTER — OFFICE VISIT (OUTPATIENT)
Dept: ORTHOPEDICS | Facility: CLINIC | Age: 54
End: 2024-03-18
Payer: MEDICARE

## 2024-03-18 VITALS
DIASTOLIC BLOOD PRESSURE: 78 MMHG | SYSTOLIC BLOOD PRESSURE: 114 MMHG | HEART RATE: 80 BPM | RESPIRATION RATE: 18 BRPM | HEIGHT: 67 IN | TEMPERATURE: 98 F | BODY MASS INDEX: 41.15 KG/M2 | WEIGHT: 262.19 LBS

## 2024-03-18 DIAGNOSIS — M25.561 RIGHT KNEE PAIN, UNSPECIFIED CHRONICITY: Primary | ICD-10-CM

## 2024-03-18 DIAGNOSIS — M25.561 RIGHT KNEE PAIN, UNSPECIFIED CHRONICITY: ICD-10-CM

## 2024-03-18 PROCEDURE — 1159F MED LIST DOCD IN RCRD: CPT | Mod: CPTII,,, | Performed by: REHABILITATION UNIT

## 2024-03-18 PROCEDURE — 73564 X-RAY EXAM KNEE 4 OR MORE: CPT | Mod: RT,,, | Performed by: REHABILITATION UNIT

## 2024-03-18 PROCEDURE — 3078F DIAST BP <80 MM HG: CPT | Mod: CPTII,,, | Performed by: REHABILITATION UNIT

## 2024-03-18 PROCEDURE — 3008F BODY MASS INDEX DOCD: CPT | Mod: CPTII,,, | Performed by: REHABILITATION UNIT

## 2024-03-18 PROCEDURE — 99213 OFFICE O/P EST LOW 20 MIN: CPT | Mod: ,,, | Performed by: REHABILITATION UNIT

## 2024-03-18 PROCEDURE — 3044F HG A1C LEVEL LT 7.0%: CPT | Mod: CPTII,,, | Performed by: REHABILITATION UNIT

## 2024-03-18 PROCEDURE — 3074F SYST BP LT 130 MM HG: CPT | Mod: CPTII,,, | Performed by: REHABILITATION UNIT

## 2024-03-18 RX ORDER — SERDEXMETHYLPHENIDATE AND DEXMETHYLPHENIDATE 7.8; 39.2 MG/1; MG/1
1 CAPSULE ORAL DAILY
COMMUNITY
Start: 2023-12-28

## 2024-03-18 RX ORDER — TOLTERODINE 4 MG/1
4 CAPSULE, EXTENDED RELEASE ORAL DAILY
COMMUNITY

## 2024-03-18 RX ORDER — CARIPRAZINE 3 MG/1
3 CAPSULE, GELATIN COATED ORAL DAILY
COMMUNITY

## 2024-03-18 NOTE — PROGRESS NOTES
Subjective:      Patient ID: Mini Chappell is a 54 y.o. female.    Chief Complaint: Pain of the Right Knee (Patient had relief from the previous injection but on yesterday she felt increased pain to the right knee. Report no injury to right knee.)    HPI:   Mini Chappell is a 54 y.o. female who presents today for follow up evaluation of her right knee.  She was last seen around a month ago.  She was provided with a steroid injection which was helpful for her knee.  She had an additional fall this morning and has had an increase in pain and issue since then.  She reports an unstable feeling.  She has an antalgic gait.  She has been ambulating with a cane.  Denies sensory or motor changes.    Initial HPI:    She reports that around 2 months ago she was getting into bed when she twisted her knee.  Pain has been progressive.  No sensory or motor changes distally.  She has pain to the medial aspect of her knee.  She is some associated swelling and stiffness.  No gross instability or mechanical symptoms.  No therapy or injections to date.    Past Medical History:   Diagnosis Date    Anxiety     Bipolar affective     Chronic fatigue     Depression     Fibromyalgia     GERD (gastroesophageal reflux disease)     Migraines     Pulmonary embolism     Sleep apnea, unspecified     Unspecified osteoarthritis, unspecified site      Past Surgical History:   Procedure Laterality Date    DILATION AND CURETTAGE OF UTERUS      gastric sleeve      Heart monitor loop      HYSTERECTOMY      TUBAL LIGATION       Social History     Socioeconomic History    Marital status:    Tobacco Use    Smoking status: Former     Current packs/day: 0.00     Average packs/day: 0.5 packs/day for 12.0 years (6.0 ttl pk-yrs)     Types: Cigarettes     Start date: 11/3/2008     Quit date: 11/3/2020     Years since quitting: 3.3    Smokeless tobacco: Never    Tobacco comments:     Patient smokes medical marijuana   Substance and Sexual Activity     Alcohol use: Not Currently    Drug use: Yes     Types: Amphetamines, Benzodiazepines, Marijuana     Comment: These are prescriptions    Sexual activity: Not Currently     Partners: Male     Birth control/protection: See Surgical Hx         Current Outpatient Medications:     albuterol (PROVENTIL/VENTOLIN HFA) 90 mcg/actuation inhaler, Inhale 2 puffs into the lungs every 6 (six) hours as needed for Wheezing. Rescue, Disp: 18 g, Rfl: 11    amitriptyline (ELAVIL) 75 MG tablet, Take 75 mg by mouth every evening., Disp: , Rfl:     atorvastatin (LIPITOR) 40 MG tablet, Take 40 mg by mouth once daily., Disp: , Rfl:     AZSTARYS 39.2 mg- 7.8 mg Cap, Take 1 capsule by mouth Daily., Disp: , Rfl:     BELSOMRA 10 mg Tab, Take 10 mg by mouth Daily., Disp: , Rfl:     calcium citrate/vitamin D3 (CITRACAL + D ORAL), Take 630 mg by mouth 2 (two) times a day., Disp: , Rfl:     COQ10, UBIQUINOL, ORAL, Take 100 mg by mouth Daily., Disp: , Rfl:     cycloSPORINE (RESTASIS) 0.05 % ophthalmic emulsion, 1 drop 2 (two) times daily., Disp: , Rfl:     famotidine (PEPCID) 20 MG tablet, Take 20 mg by mouth 2 (two) times daily., Disp: , Rfl:     ipratropium (ATROVENT) 42 mcg (0.06 %) nasal spray, 2 sprays by Nasal route 4 (four) times daily., Disp: , Rfl:     levomefolate calcium (ELFOLATE) 15 mg Tab, Take 15 mg by mouth Daily., Disp: , Rfl:     melatonin 10 mg Tab, Take 10 mg by mouth Daily., Disp: , Rfl:     mupirocin (BACTROBAN) 2 % ointment, Apply topically., Disp: , Rfl:     mv,calcium,min/iron/folic/vitK (ONE-A-DAY WOMEN'S COMPLETE ORAL), Take by mouth Daily., Disp: , Rfl:     nystatin (MYCOSTATIN) cream, Apply topically., Disp: , Rfl:     pantoprazole (PROTONIX) 40 MG tablet, Take 40 mg by mouth once daily., Disp: , Rfl:     tiZANidine (ZANAFLEX) 4 MG tablet, Take 4 mg by mouth Daily., Disp: , Rfl:     tolterodine (DETROL LA) 4 MG 24 hr capsule, Take 4 mg by mouth once daily., Disp: , Rfl:     triamcinolone acetonide 0.1% (KENALOG) 0.1 %  cream, Apply topically once as needed., Disp: , Rfl:     TRINTELLIX 20 mg Tab, Take 20 mg by mouth nightly., Disp: , Rfl:     UNABLE TO FIND, Inhale into the lungs every 6 (six) hours. medication name: Prescription Cannabis, Disp: , Rfl:     UNABLE TO FIND, medication name: Sea Vitamin D3 1 capsule 50,000intl units oral daily, Disp: , Rfl:     valACYclovir (VALTREX) 500 MG tablet, Take 500 mg by mouth 2 (two) times daily. 1 tab po bid for 3 days prn, Disp: , Rfl:     VRAYLAR 3 mg Cap, Take 3 mg by mouth Daily., Disp: , Rfl:     XARELTO 10 mg Tab, Take 10 mg by mouth Daily., Disp: , Rfl:     albuterol (PROVENTIL/VENTOLIN HFA) 90 mcg/actuation inhaler, Inhale into the lungs., Disp: , Rfl:     celecoxib (CELEBREX) 100 MG capsule, Take 200 mg by mouth 2 (two) times daily., Disp: , Rfl:     cetirizine (ZYRTEC) 10 mg Cap, Take by mouth., Disp: , Rfl:     clonazePAM (KLONOPIN) 1 MG tablet, Take 1 mg by mouth 3 (three) times daily., Disp: , Rfl:     cyanocobalamin (VITAMIN B-12) 1000 MCG tablet, Take 1,000 mcg by mouth once daily., Disp: , Rfl:     desonide (DESOWEN) 0.05 % cream, Apply topically 2 (two) times daily., Disp: , Rfl:     guaifenesin/dextromethorphan (ROBITUSSIN-COUGH-CHEST-TIERNEY ORAL), Take by mouth., Disp: , Rfl:     lisdexamfetamine (VYVANSE) 70 MG capsule, Take 70 mg by mouth every morning., Disp: , Rfl:     loratadine (CLARITIN) 10 mg tablet, Take 10 mg by mouth once daily., Disp: , Rfl:     OXcarbazepine (TRILEPTAL) 300 MG Tab, Take 300 mg by mouth 2 (two) times daily., Disp: , Rfl:     REXULTI 1 mg Tab, Take 1 mg by mouth Daily., Disp: , Rfl:     tolterodine (DETROL) 2 MG Tab, Take 4 mg by mouth Daily., Disp: , Rfl:     traMADoL (ULTRAM) 50 mg tablet, Take 1 tablet (50 mg total) by mouth every 6 (six) hours as needed for Pain., Disp: 12 tablet, Rfl: 0    traZODone (DESYREL) 100 MG tablet, Take 200 mg by mouth nightly as needed., Disp: , Rfl:   Review of patient's allergies indicates:   Allergen  "Reactions    Erythromycin base Diarrhea and Nausea And Vomiting     Stomach cramps    Macrolide antibiotics Diarrhea, Rash and Nausea And Vomiting     Other reaction(s): diarrhea/vomiting    Makes her sick     Makes her sick    Makes her sick    Other reaction(s): diarrhea/vomiting   Makes her sick    Makes her sick    Stomach cramps       /78   Pulse 80   Temp 98.4 °F (36.9 °C)   Resp 18   Ht 5' 7" (1.702 m)   Wt 118.9 kg (262 lb 3.2 oz)   BMI 41.07 kg/m²     Comprehensive review of systems completed and negative except as per HPI.        Objective:   Head: Normocephalic, without obvious abnormality, atraumatic  Eyes: conjunctivae/corneas clear. EOM's intact  Ears: normal external appearance  Nose: Nares normal. Septum midline. Mucosa normal. No drainage  Throat: normal findings: lips normal without lesions  Lungs: unlabored breathing on room air  Chest wall: symmetric chest rise  Heart: regular rate and rhythm  Pulses: 2+ and symmetric  Skin: Skin color, texture, turgor normal. No rashes or lesions  Neurologic: Grossly normal    right KNEE:     Alignment: neutral  Appearance: skin in intact without lesion  Effusion: small  Gait: antalgic  Straight Leg Raise: negative  Log Roll: negative  Hip ROM: full and painless  Ankle ROM: full and painless   Knee ROM:  0 - 120  Tenderness: TTP diffusely  Patellar Mobility: normal  Patellar grind: negative  PF Crepitus: negative        Vignesh Test:  Inconclusive  Varus and valgus stressing was completed but limited due to guarding  Valgus Stress @ 0 deg: stable  Valgus Stress @ 30 deg: stable  Varus Stress @ 0 deg: stable  Varus Stress @ 30 deg: stable  Lachman:  Unable to assess secondary to guarding  Ant Drawer: negative   Post Drawer: negative  Posterior Sag Sign: negative  Neurological deficits: SILT dp/sp/t distributions  Motor: 5/5 EHL/FHL/TA/GS    Warm well perfused lower extremity with capillary refill less than 2 seconds and sensation intact to light " touch in the terminal nerve distributions. Calf soft and easily compressible without clinical sign of DVT. No palpable popliteal lymphadenopathy    Assessment:     Imaging:   Four view weight bearing radiographs of the right knee obtained today personally reviewed showing no acute fractures or dislocations.  Mild to moderate tricompartmental degenerative changes most severe in the medial and patellofemoral compartments with joint space narrowing and small osteophyte formation.  No gross malalignment.         1. Right knee pain, unspecified chronicity          Plan:       Orders Placed This Encounter    X-Ray Knee Complete 4 Or More Views Right     Imaging and exam findings discussed.  She sustained acute right knee injury and was responded well to a steroid injection until she fell again today.  She has pain and persistent instability.  Pain is diffusely about the knee.  Exam is limited by guarding due to the acute nature of her injury.  She does endorse maximal pain medial.  She has required a cane.  We are going to proceed with the MRI out of concern for internal derangement.  I will see her back after this to discuss results and treatment plan. Symptomatic treatment with over-the-counter anti-inflammatories as needed and medically able.  Avoid aggravating activities.  Questions were answered and she was in agreement.

## 2024-04-01 ENCOUNTER — OFFICE VISIT (OUTPATIENT)
Dept: ORTHOPEDICS | Facility: CLINIC | Age: 54
End: 2024-04-01
Payer: MEDICARE

## 2024-04-01 VITALS
HEART RATE: 108 BPM | BODY MASS INDEX: 41.14 KG/M2 | HEIGHT: 67 IN | SYSTOLIC BLOOD PRESSURE: 121 MMHG | WEIGHT: 262.13 LBS | DIASTOLIC BLOOD PRESSURE: 80 MMHG

## 2024-04-01 DIAGNOSIS — M17.11 PRIMARY OSTEOARTHRITIS OF RIGHT KNEE: Primary | ICD-10-CM

## 2024-04-01 PROCEDURE — 3079F DIAST BP 80-89 MM HG: CPT | Mod: CPTII,,, | Performed by: REHABILITATION UNIT

## 2024-04-01 PROCEDURE — 3044F HG A1C LEVEL LT 7.0%: CPT | Mod: CPTII,,, | Performed by: REHABILITATION UNIT

## 2024-04-01 PROCEDURE — 3074F SYST BP LT 130 MM HG: CPT | Mod: CPTII,,, | Performed by: REHABILITATION UNIT

## 2024-04-01 PROCEDURE — 99213 OFFICE O/P EST LOW 20 MIN: CPT | Mod: ,,, | Performed by: REHABILITATION UNIT

## 2024-04-01 PROCEDURE — 1159F MED LIST DOCD IN RCRD: CPT | Mod: CPTII,,, | Performed by: REHABILITATION UNIT

## 2024-04-01 PROCEDURE — 3008F BODY MASS INDEX DOCD: CPT | Mod: CPTII,,, | Performed by: REHABILITATION UNIT

## 2024-04-01 RX ORDER — SUCRALFATE 1 G/1
1 TABLET ORAL 4 TIMES DAILY PRN
COMMUNITY

## 2024-04-01 RX ORDER — DEXTROMETHORPHAN HYDROBROMIDE, GUAIFENESIN 5; 100 MG/5ML; MG/5ML
650 LIQUID ORAL EVERY 8 HOURS PRN
COMMUNITY

## 2024-04-01 RX ORDER — BUTALBITAL, ACETAMINOPHEN AND CAFFEINE 50; 325; 40 MG/1; MG/1; MG/1
1 TABLET ORAL EVERY 6 HOURS PRN
COMMUNITY
Start: 2024-03-21 | End: 2024-04-04

## 2024-04-01 RX ORDER — CHOLECALCIFEROL (VITAMIN D3) 25 MCG
1000 TABLET ORAL WEEKLY
COMMUNITY

## 2024-04-01 RX ORDER — BUTALB/ACETAMINOPHEN/CAFFEINE 50-325-40
1 TABLET ORAL
COMMUNITY

## 2024-04-01 RX ORDER — CHOLECALCIFEROL (VITAMIN D3) 1250 MCG
1250 TABLET ORAL
COMMUNITY

## 2024-04-01 RX ORDER — PRAZOSIN HYDROCHLORIDE 1 MG/1
1 CAPSULE ORAL NIGHTLY
COMMUNITY

## 2024-04-01 RX ORDER — ACETAMINOPHEN AND CODEINE PHOSPHATE 300; 30 MG/1; MG/1
1 TABLET ORAL EVERY 6 HOURS PRN
COMMUNITY
Start: 2023-12-21

## 2024-04-01 NOTE — PROGRESS NOTES
Subjective:      Patient ID: Mini Chappell is a 54 y.o. female.    Chief Complaint: Results (Right knee MRI results)    HPI:   Mini Chappell is a 54 y.o. female who presents today for follow up evaluation of her right knee.  She comes in today for MRI follow up.  No significant changes in the interim.  Pain is maximally to the anterior aspect of her knee.  She reports that she is starting physical therapy next week at [facility] on [address].  No mechanical symptoms.  She does have some subjective instability.  No sensory or motor changes.      Prior HPI:  She was last seen around a month ago.  She was provided with a steroid injection which was helpful for her knee.  She had an additional fall this morning and has had an increase in pain and issue since then.  She reports an unstable feeling.  She has an antalgic gait.  She has been ambulating with a cane.  Denies sensory or motor changes.    Initial HPI:    She reports that around 2 months ago she was getting into bed when she twisted her knee.  Pain has been progressive.  No sensory or motor changes distally.  She has pain to the medial aspect of her knee.  She is some associated swelling and stiffness.  No gross instability or mechanical symptoms.  No therapy or injections to date.    Past Medical History:   Diagnosis Date    Anxiety     Bipolar affective     Chronic fatigue     Depression     Fibromyalgia     GERD (gastroesophageal reflux disease)     Migraines     Pulmonary embolism     Sleep apnea, unspecified     Unspecified osteoarthritis, unspecified site      Past Surgical History:   Procedure Laterality Date    DILATION AND CURETTAGE OF UTERUS      gastric sleeve      Heart monitor loop      HYSTERECTOMY      TUBAL LIGATION       Social History     Socioeconomic History    Marital status:    Tobacco Use    Smoking status: Former     Current packs/day: 0.00     Average packs/day: 0.5 packs/day for 12.0 years (6.0 ttl pk-yrs)     Types: Cigarettes      Start date: 11/3/2008     Quit date: 11/3/2020     Years since quitting: 3.4    Smokeless tobacco: Never    Tobacco comments:     Patient smokes medical marijuana   Substance and Sexual Activity    Alcohol use: Not Currently    Drug use: Yes     Types: Amphetamines, Benzodiazepines, Marijuana     Comment: These are prescriptions    Sexual activity: Not Currently     Partners: Male     Birth control/protection: See Surgical Hx     Social Determinants of Health     Financial Resource Strain: Low Risk  (12/27/2023)    [facility]    Overall Financial Resource Strain (CARDIA)     Difficulty of Paying Living Expenses: Not hard at all   Food Insecurity: No Food Insecurity (12/27/2023)    [facility]    Hunger Vital Sign     Worried About Running Out of Food in the Last Year: Never true     Ran Out of Food in the Last Year: Never true   Transportation Needs: No Transportation Needs (12/27/2023)    [facility]    PRAPARE - Transportation     Lack of Transportation (Medical): No     Lack of Transportation (Non-Medical): No   Physical Activity: Inactive (12/27/2023)    [facility]    Exercise Vital Sign     Days of Exercise per Week: 0 days     Minutes of Exercise per Session: 0 min   Stress: Stress Concern Present (12/27/2023)    [facility]    [employer] [place] [employer] - Occupational Stress Questionnaire     Feeling of Stress : Very much   Social Connections: Moderately Isolated (12/27/2023)    Received from [facility] Its Subsidi[facility]  and Affiliates    Social Connection and Isolation Panel [NHANES]     Frequency of Communication with Friends and Family: Three times a week     Frequency of Social Gatherings with Friends and Family: Once a week     Attends Denominational Services: More than 4 times per year     Active Member of Clubs or Organizations: No     Attends Club or Organization Meetings: Never     Marital Status:    Housing Stability: Low Risk  (12/27/2023)    [facility] and Affiliates    Housing Stability Vital Sign     Unable to Pay for Housing in the Last Year: No     Number of Places Lived in the Last Year: 1     In the last 12 months, was there a time when you did not have a steady place to sleep or slept in a shelter (including now)?: No         Current Outpatient Medications:     acetaminophen (TYLENOL) 650 MG TbSR, Take 650 mg by mouth every 8 (eight) hours as needed., Disp: , Rfl:     acetaminophen-codeine 300-30mg (TYLENOL #3) 300-30 mg Tab, Take 1 tablet by mouth every 6 (six) hours as needed., Disp: , Rfl:     albuterol (PROVENTIL/VENTOLIN HFA) 90 mcg/actuation inhaler, Inhale 2 puffs into the lungs every 6 (six) hours as needed for Wheezing. Rescue, Disp: 18 g, Rfl: 11    amitriptyline (ELAVIL) 75 MG tablet, Take 75 mg by mouth every evening., Disp: , Rfl:     atorvastatin (LIPITOR) 40 MG tablet, Take 40 mg by mouth once daily., Disp: , Rfl:     AZSTARYS 39.2 mg- 7.8 mg Cap, Take 1 capsule by mouth Daily., Disp: , Rfl:     BELSOMRA 10 mg Tab, Take 10 mg by mouth Daily., Disp: , Rfl:     calcium citrate-vitamin D3 315-200 mg (CITRACAL+D) 315 mg-5 mcg (200 unit) per tablet, Take 1 tablet by mouth., Disp: , Rfl:     calcium citrate/vitamin D3 (CITRACAL + D ORAL), Take 630 mg by mouth 2 (two) times a day., Disp: , Rfl:     cholecalciferol, vitamin D3, 1,250 mcg (50,000 unit) Tab, Take 1,250 mcg by mouth., Disp: , Rfl:     COQ10, UBIQUINOL, ORAL, Take 100  mg by mouth Daily., Disp: , Rfl:     cycloSPORINE (RESTASIS) 0.05 % ophthalmic emulsion, 1 drop 2 (two) times daily., Disp: , Rfl:     esomeprazole mag-glycerin 20 mg kcsp, , Disp: , Rfl:     famotidine (PEPCID) 20 MG tablet, Take 20 mg by mouth 2 (two) times daily., Disp: , Rfl:     ipratropium (ATROVENT) 42 mcg (0.06 %) nasal spray, 2 sprays by Nasal route 4 (four) times daily., Disp: , Rfl:     levomefolate calcium (ELFOLATE) 15 mg Tab, Take 15 mg by mouth Daily., Disp: , Rfl:     melatonin 10 mg Tab, Take 10 mg by mouth Daily., Disp: , Rfl:     MINIPRESS 1 mg Cap, Take 1 mg by mouth every evening., Disp: , Rfl:     multivitamin with minerals tablet, Take 1 tablet by mouth., Disp: , Rfl:     mupirocin (BACTROBAN) 2 % ointment, Apply topically., Disp: , Rfl:     mv,calcium,min/iron/folic/vitK (ONE-A-DAY WOMEN'S COMPLETE ORAL), Take by mouth Daily., Disp: , Rfl:     nystatin (MYCOSTATIN) cream, Apply topically., Disp: , Rfl:     sucralfate (CARAFATE) 1 gram tablet, Take 1 g by mouth 4 (four) times daily as needed., Disp: , Rfl:     tiZANidine (ZANAFLEX) 4 MG tablet, Take 4 mg by mouth Daily., Disp: , Rfl:     tolterodine (DETROL LA) 4 MG 24 hr capsule, Take 4 mg by mouth once daily., Disp: , Rfl:     triamcinolone acetonide 0.1% (KENALOG) 0.1 % cream, Apply topically once as needed., Disp: , Rfl:     TRINTELLIX 20 mg Tab, Take 20 mg by mouth nightly., Disp: , Rfl:     UNABLE TO FIND, Inhale into the lungs every 6 (six) hours. medication name: Prescription Cannabis, Disp: , Rfl:     valACYclovir (VALTREX) 500 MG tablet, Take 500 mg by mouth 2 (two) times daily. 1 tab po bid for 3 days prn, Disp: , Rfl:     vitamin D (VITAMIN D3) 1000 units Tab, Take 1,000 Units by mouth once a week., Disp: , Rfl:     VRAYLAR 3 mg Cap, Take 3 mg by mouth Daily., Disp: , Rfl:     XARELTO 10 mg Tab, Take 10 mg by mouth Daily., Disp: , Rfl:     pantoprazole (PROTONIX) 40 MG tablet, Take 40 mg by mouth once daily., Disp: , Rfl:      "UNABLE TO FIND, medication name: Sea Vitamin D3 1 capsule 50,000intl units oral daily, Disp: , Rfl:   Review of patient's allergies indicates:   Allergen Reactions    Erythromycin base Diarrhea and Nausea And Vomiting     Stomach cramps    Macrolide antibiotics Diarrhea, Rash and Nausea And Vomiting     Other reaction(s): diarrhea/vomiting    Makes her sick     Makes her sick    Makes her sick    Other reaction(s): diarrhea/vomiting   Makes her sick    Makes her sick    Stomach cramps       /80   Pulse 108   Ht 5' 7" (1.702 m)   Wt 118.9 kg (262 lb 2 oz)   BMI 41.05 kg/m²     Comprehensive review of systems completed and negative except as per HPI.        Objective:   Head: Normocephalic, without obvious abnormality, atraumatic  Eyes: conjunctivae/corneas clear. EOM's intact  Ears: normal external appearance  Nose: Nares normal. Septum midline. Mucosa normal. No drainage  Throat: normal findings: lips normal without lesions  Lungs: unlabored breathing on room air  Chest wall: symmetric chest rise  Heart: regular rate and rhythm  Pulses: 2+ and symmetric  Skin: Skin color, texture, turgor normal. No rashes or lesions  Neurologic: Grossly normal    right KNEE:     Alignment: neutral  Appearance: skin in intact without lesion  Effusion: small  Gait: antalgic  Straight Leg Raise: negative  Log Roll: negative  Hip ROM: full and painless  Ankle ROM: full and painless   Knee ROM:  0 - 120  Tenderness: TTP diffusely anterior with no significant medial or lateral joint line tenderness today  Patellar Mobility: normal  Patellar grind: negative  PF Crepitus: negative        Vignesh Test:  -  Valgus Stress @ 0 deg: stable  Valgus Stress @ 30 deg: stable  Varus Stress @ 0 deg: stable  Varus Stress @ 30 deg: stable  Lachman:  Stable  Ant Drawer: negative   Post Drawer: negative  Posterior Sag Sign: negative  Neurological deficits: SILT dp/sp/t distributions  Motor: 5/5 EHL/FHL/TA/GS    Warm well perfused lower " extremity with capillary refill less than 2 seconds and sensation intact to light touch in the terminal nerve distributions. Calf soft and easily compressible without clinical sign of DVT. No palpable popliteal lymphadenopathy    Assessment:     Imaging:   Four view weight bearing radiographs of the right knee previously obtained show no acute fractures or dislocations.  Mild to moderate tricompartmental degenerative changes most severe in the medial and patellofemoral compartments with joint space narrowing and small osteophyte formation.  No gross malalignment.     MRI Knee Without Contrast Right  Narrative: EXAMINATION:  MRI KNEE WITHOUT CONTRAST RIGHT    CLINICAL HISTORY:  suspected meniscal injury;Pain in right knee    TECHNIQUE:  Multiplanar, multisequence images were performed about the right knee.  No contrast was administered.    COMPARISON:  Radiographs dated 03/18/2024    FINDINGS:  Exam is suboptimal secondary to motion artifact.    The anterior and posterior cruciate ligaments are intact.    The medial collateral ligament and lateral collateral ligamentous complex are intact.    The extensor mechanism is intact.    Probable nondisplaced horizontal undersurface tear of the medial meniscal body.    The lateral meniscus is intact.    There is full-thickness cartilage loss over the lateral trochlear facet, as well as full-thickness fissuring over the median patellar ridge.  The medial and lateral compartment cartilage appears grossly intact without focal full-thickness defect.    There is a large knee joint effusion and moderate Baker's cyst with associated synovitis.    The intra-articular fat pads are normal.    Marrow signal is normal.  Musculature is overall normal in bulk and signal.    Focus of increased signal overlying the proximal patellar tendon.  Impression: Probable nondisplaced horizontal undersurface tear of the medial meniscal body.    Moderate patellofemoral cartilage disease.    Large knee  joint effusion and moderate Baker cyst with associated synovitis.    Findings of mild prepatellar bursitis.    Electronically signed by: Mike Amato  Date:    03/29/2024  Time:    08:50    MRI shows effusion with Baker's cyst and synovitis.  Prepatellar bursitis.  Moderately advanced patellofemoral osteoarthritis.  Questionable nondisplaced horizontal undersurface tear of the medial meniscus.        1. Primary osteoarthritis of right knee            Plan:       Orders Placed This Encounter    Prior authorization Order       Imaging and exam findings discussed.  She sustained acute right knee injury and was responded well to a steroid injection until she fell again.  MRI was completed which shows patellofemoral osteoarthritis as well as a questionable medial meniscus tear.  She has no mechanical symptoms and no medial joint line tenderness.  All of her pain is anterior.  She has recently had a steroid injection which provided good but temporary relief.  I think she would benefit from a viscosupplementation injection.  We will submit for this and I will see her back once approved.  In the interim she will continue symptomatic treatment with over-the-counter anti-inflammatories as needed and medically able.  Avoid aggravating activities.  She is scheduled to start physical therapy and this will also be beneficial.  Questions were answered and she was in agreement.

## 2024-05-01 ENCOUNTER — OFFICE VISIT (OUTPATIENT)
Dept: ORTHOPEDICS | Facility: CLINIC | Age: 54
End: 2024-05-01
Payer: MEDICARE

## 2024-05-01 VITALS — HEIGHT: 67 IN | WEIGHT: 275.63 LBS | BODY MASS INDEX: 43.26 KG/M2

## 2024-05-01 DIAGNOSIS — M17.11 PRIMARY OSTEOARTHRITIS OF RIGHT KNEE: Primary | ICD-10-CM

## 2024-05-01 PROCEDURE — 99499 UNLISTED E&M SERVICE: CPT | Mod: ,,, | Performed by: REHABILITATION UNIT

## 2024-05-01 PROCEDURE — 20610 DRAIN/INJ JOINT/BURSA W/O US: CPT | Mod: RT,,, | Performed by: REHABILITATION UNIT

## 2024-05-01 NOTE — PROGRESS NOTES
Patient presents today for scheduled viscosupplementation injection.  This was provided as below and she tolerated well.  Post-injection care was discussed.  She will follow up with me as needed.  Questions were answered and she was in agreement.    Large Joint Aspiration/Injection: R knee    Date/Time: 5/1/2024 1:15 PM    Performed by: Emmanuel Escalera MD  Authorized by: Emmanuel Escalera MD    Consent Done?:  Yes (Verbal)  Indications:  Pain and arthritis  Site marked: the procedure site was marked    Timeout: prior to procedure the correct patient, procedure, and site was verified    Prep: patient was prepped and draped in usual sterile fashion      Local anesthesia used?: Yes    Local anesthetic:  Lidocaine 2% without epinephrine  Anesthetic total (ml):  4      Details:  Needle Size:  18 G and 22 G  Ultrasonic Guidance for needle placement?: No    Approach:  Lateral (superolateral)  Location:  Knee  Site:  R knee  Medications:  48 mg hylan g-f 20 48 mg/6 mL  Patient tolerance:  Patient tolerated the procedure well with no immediate complications

## 2024-05-08 ENCOUNTER — PATIENT MESSAGE (OUTPATIENT)
Dept: ORTHOPEDIC SURGERY | Facility: CLINIC | Age: 54
End: 2024-05-08
Payer: MEDICARE

## 2024-06-10 ENCOUNTER — OFFICE VISIT (OUTPATIENT)
Dept: ORTHOPEDICS | Facility: CLINIC | Age: 54
End: 2024-06-10
Payer: MEDICARE

## 2024-06-10 VITALS
SYSTOLIC BLOOD PRESSURE: 113 MMHG | WEIGHT: 275.56 LBS | DIASTOLIC BLOOD PRESSURE: 75 MMHG | HEIGHT: 67 IN | HEART RATE: 69 BPM | BODY MASS INDEX: 43.25 KG/M2

## 2024-06-10 DIAGNOSIS — M20.011 MALLET DEFORMITY OF RIGHT MIDDLE FINGER: Primary | ICD-10-CM

## 2024-06-10 PROCEDURE — 1159F MED LIST DOCD IN RCRD: CPT | Mod: CPTII,,,

## 2024-06-10 PROCEDURE — 1160F RVW MEDS BY RX/DR IN RCRD: CPT | Mod: CPTII,,,

## 2024-06-10 PROCEDURE — 3074F SYST BP LT 130 MM HG: CPT | Mod: CPTII,,,

## 2024-06-10 PROCEDURE — 99213 OFFICE O/P EST LOW 20 MIN: CPT | Mod: ,,,

## 2024-06-10 PROCEDURE — 3008F BODY MASS INDEX DOCD: CPT | Mod: CPTII,,,

## 2024-06-10 PROCEDURE — 3078F DIAST BP <80 MM HG: CPT | Mod: CPTII,,,

## 2024-06-10 PROCEDURE — 3044F HG A1C LEVEL LT 7.0%: CPT | Mod: CPTII,,,

## 2024-06-10 NOTE — PROGRESS NOTES
Chief Complaint:  right middle finger injury    Consulting Physician: No ref. provider found    History of present illness:     Patient is a 53-year-old right-hand dominant female who is retired who presents for follow up evaluation of her right middle finger injury that she sustained on 12/30/2023.  I saw her in my clinic last time where I diagnosed her with a right middle finger soft tissue mallet injury which we treated conservatively with a Stax splint.  She has done a good job keeping this on at all times.    06/10/2024:  Patient is here today for follow-up.  She states that she experiences pain if she hits her finger on something but otherwise is doing very good.      Past Medical History:   Diagnosis Date    Anxiety     Bipolar affective     Chronic fatigue     Depression     Fibromyalgia     GERD (gastroesophageal reflux disease)     Migraines     Pulmonary embolism     Sleep apnea, unspecified     Unspecified osteoarthritis, unspecified site        Past Surgical History:   Procedure Laterality Date    DILATION AND CURETTAGE OF UTERUS      gastric sleeve      Heart monitor loop      HYSTERECTOMY      TUBAL LIGATION         Current Outpatient Medications   Medication Sig    acetaminophen (TYLENOL) 650 MG TbSR Take 650 mg by mouth every 8 (eight) hours as needed.    acetaminophen-codeine 300-30mg (TYLENOL #3) 300-30 mg Tab Take 1 tablet by mouth every 6 (six) hours as needed.    albuterol (PROVENTIL/VENTOLIN HFA) 90 mcg/actuation inhaler Inhale 2 puffs into the lungs every 6 (six) hours as needed for Wheezing. Rescue    atorvastatin (LIPITOR) 40 MG tablet Take 40 mg by mouth once daily.    AZSTARYS 39.2 mg- 7.8 mg Cap Take 1 capsule by mouth Daily.    BELSOMRA 10 mg Tab Take 10 mg by mouth Daily.    cholecalciferol, vitamin D3, 1,250 mcg (50,000 unit) Tab Take 1,250 mcg by mouth.    COQ10, UBIQUINOL, ORAL Take 100 mg by mouth Daily.    cycloSPORINE (RESTASIS) 0.05 % ophthalmic emulsion 1 drop 2 (two) times  daily.    esomeprazole mag-glycerin 20 mg kcsp     famotidine (PEPCID) 20 MG tablet Take 20 mg by mouth 2 (two) times daily.    ipratropium (ATROVENT) 42 mcg (0.06 %) nasal spray 2 sprays by Nasal route 4 (four) times daily.    levomefolate calcium (ELFOLATE) 15 mg Tab Take 15 mg by mouth Daily.    melatonin 10 mg Tab Take 10 mg by mouth Daily.    MINIPRESS 1 mg Cap Take 1 mg by mouth every evening.    multivitamin with minerals tablet Take 1 tablet by mouth.    mupirocin (BACTROBAN) 2 % ointment Apply topically.    nystatin (MYCOSTATIN) cream Apply topically.    pantoprazole (PROTONIX) 40 MG tablet Take 40 mg by mouth once daily.    tiZANidine (ZANAFLEX) 4 MG tablet Take 4 mg by mouth Daily.    tolterodine (DETROL LA) 4 MG 24 hr capsule Take 4 mg by mouth once daily.    triamcinolone acetonide 0.1% (KENALOG) 0.1 % cream Apply topically once as needed.    TRINTELLIX 20 mg Tab Take 20 mg by mouth nightly.    UNABLE TO FIND Inhale into the lungs every 6 (six) hours. medication name: Prescription Cannabis    UNABLE TO FIND medication name: Sea Vitamin D3 1 capsule 50,000intl units oral daily    valACYclovir (VALTREX) 500 MG tablet Take 500 mg by mouth 2 (two) times daily. 1 tab po bid for 3 days prn    vitamin D (VITAMIN D3) 1000 units Tab Take 1,000 Units by mouth once a week.    VRAYLAR 3 mg Cap Take 3 mg by mouth Daily.    XARELTO 10 mg Tab Take 10 mg by mouth Daily.    amitriptyline (ELAVIL) 75 MG tablet Take 75 mg by mouth every evening.    calcium citrate-vitamin D3 315-200 mg (CITRACAL+D) 315 mg-5 mcg (200 unit) per tablet Take 1 tablet by mouth.    calcium citrate/vitamin D3 (CITRACAL + D ORAL) Take 630 mg by mouth 2 (two) times a day.    mv,calcium,min/iron/folic/vitK (ONE-A-DAY WOMEN'S COMPLETE ORAL) Take by mouth Daily.    sucralfate (CARAFATE) 1 gram tablet Take 1 g by mouth 4 (four) times daily as needed.     No current facility-administered medications for this visit.       Review of patient's  allergies indicates:   Allergen Reactions    Erythromycin base Diarrhea and Nausea And Vomiting     Stomach cramps    Macrolide antibiotics Diarrhea, Rash and Nausea And Vomiting     Other reaction(s): diarrhea/vomiting    Makes her sick     Makes her sick    Makes her sick    Other reaction(s): diarrhea/vomiting   Makes her sick    Makes her sick    Stomach cramps       Family History   Adopted: Yes   Family history unknown: Yes       Social History     Socioeconomic History    Marital status:    Tobacco Use    Smoking status: Former     Current packs/day: 0.00     Average packs/day: 0.5 packs/day for 12.0 years (6.0 ttl pk-yrs)     Types: Cigarettes     Start date: 11/3/2008     Quit date: 11/3/2020     Years since quitting: 3.6    Smokeless tobacco: Never    Tobacco comments:     Patient smokes medical marijuana   Substance and Sexual Activity    Alcohol use: Not Currently    Drug use: Yes     Types: Amphetamines, Benzodiazepines, Marijuana     Comment: These are prescriptions    Sexual activity: Not Currently     Partners: Male     Birth control/protection: See Surgical Hx     Social Determinants of Health     Financial Resource Strain: Low Risk  (12/27/2023)    [facility] from [facility], [facility]    Overall Financial Resource Strain (CARDIA)     Difficulty of Paying Living Expenses: Not hard at all   Food Insecurity: No Food Insecurity (12/27/2023)    [facility] from [facility], [facility]    Hunger Vital Sign     Worried About Running Out of Food in the Last Year: Never true     Ran Out of Food in the Last Year: Never true   Transportation Needs: No Transportation Needs (12/27/2023)    [facility], [facility]    PRAPARE - Transportation     Lack of Transportation (Medical): No     Lack of Transportation (Non-Medical): No   Physical Activity: Inactive (12/27/2023)    [facility], [facility]    Exercise Vital Sign     Days of Exercise per Week: 0 days     Minutes of Exercise per Session: 0 min   Stress: Stress Concern Present (12/27/2023)    [facility], [facility] - Occupational Stress Questionnaire     Feeling of Stress : Very much   Housing Stability: Low Risk  (12/27/2023)    Received from [facility], [facility]    Housing Stability Vital Sign     Unable to Pay for Housing in the Last Year: No     Number of Places Lived in the Last Year: 1     In the last 12 months, was there a time when you did not have a steady place to sleep or slept in a shelter (including now)?: No       Review of Systems:    Constitution:   Denies chills, fever, and sweats.  HENT:   Denies headaches or blurry vision.  Cardiovascular:  Denies chest pain or irregular heart beat.  Respiratory:   Denies cough or shortness of breath.  Gastrointestinal:  Denies abdominal pain, nausea, or vomiting.  Musculoskeletal:   Denies muscle cramps.  Neurological:   Denies dizziness or focal weakness.  Psychiatric/Behavior: Normal mental status.  Hematology/Lymph:  Denies bleeding  "problem or easy bruising/bleeding.  Skin:    Denies rash or suspicious lesions.    Examination:    Vital Signs:    Vitals:    06/10/24 0926   BP: 113/75   Pulse: 69   Weight: 125 kg (275 lb 9.2 oz)   Height: 5' 7" (1.702 m)       Body mass index is 43.16 kg/m².    Constitution:   Well-developed, well nourished patient in no acute distress.  Neurological:   Alert and oriented x 3 and cooperative to examination.     Psychiatric/Behavior: Normal mental status.  Respiratory:   No shortness of breath.  Eyes:    Extraoccular muscles intact  Skin:    No scars, rash or suspicious lesions.    MSK:     Right middle finger: No open wounds or rashes.  Non tender to palpation over the terminal extensor tendon.  She is able to flex actively the D IP joint.  There has no more extension lag at the D IP joint.  She is able to actively flex and fully extend at the PIP joint.  There is no swan-neck or boutonniere deformity.  Sensation light touch intact in median ulnar radial distribution.  Radial pulse 2 +hand is warm well perfused    Imaging:   none     Assessment:  right middle finger soft tissue mallet injury    Plan:    She has regained full motion of her right middle finger.  She is okay to do activities as tolerated.  She can follow up as needed.     Follow Up:  As needed  Xray at next visit:  none        "

## 2025-04-09 ENCOUNTER — HOSPITAL ENCOUNTER (OUTPATIENT)
Dept: RADIOLOGY | Facility: CLINIC | Age: 55
Discharge: HOME | End: 2025-04-09
Attending: REHABILITATION UNIT
Payer: MEDICARE

## 2025-04-09 ENCOUNTER — OFFICE VISIT (OUTPATIENT)
Dept: ORTHOPEDICS | Facility: CLINIC | Age: 55
End: 2025-04-09
Payer: MEDICARE

## 2025-04-09 VITALS
HEIGHT: 67 IN | BODY MASS INDEX: 35.47 KG/M2 | WEIGHT: 226 LBS | SYSTOLIC BLOOD PRESSURE: 111 MMHG | HEART RATE: 67 BPM | DIASTOLIC BLOOD PRESSURE: 76 MMHG

## 2025-04-09 DIAGNOSIS — M25.562 LEFT KNEE PAIN, UNSPECIFIED CHRONICITY: ICD-10-CM

## 2025-04-09 DIAGNOSIS — M25.562 LEFT KNEE PAIN, UNSPECIFIED CHRONICITY: Primary | ICD-10-CM

## 2025-04-09 PROCEDURE — 73564 X-RAY EXAM KNEE 4 OR MORE: CPT | Mod: LT,,, | Performed by: REHABILITATION UNIT

## 2025-04-09 RX ORDER — BETAMETHASONE SODIUM PHOSPHATE AND BETAMETHASONE ACETATE 3; 3 MG/ML; MG/ML
12 INJECTION, SUSPENSION INTRA-ARTICULAR; INTRALESIONAL; INTRAMUSCULAR; SOFT TISSUE
Status: DISCONTINUED | OUTPATIENT
Start: 2025-04-09 | End: 2025-04-09 | Stop reason: HOSPADM

## 2025-04-09 RX ORDER — LIDOCAINE HYDROCHLORIDE 10 MG/ML
2 INJECTION, SOLUTION INFILTRATION; PERINEURAL
Status: DISCONTINUED | OUTPATIENT
Start: 2025-04-09 | End: 2025-04-09 | Stop reason: HOSPADM

## 2025-04-09 RX ORDER — ESZOPICLONE 1 MG/1
1 TABLET, FILM COATED ORAL NIGHTLY
COMMUNITY

## 2025-04-09 RX ADMIN — BETAMETHASONE SODIUM PHOSPHATE AND BETAMETHASONE ACETATE 12 MG: 3; 3 INJECTION, SUSPENSION INTRA-ARTICULAR; INTRALESIONAL; INTRAMUSCULAR; SOFT TISSUE at 09:04

## 2025-04-09 RX ADMIN — LIDOCAINE HYDROCHLORIDE 2 ML: 10 INJECTION, SOLUTION INFILTRATION; PERINEURAL at 09:04

## 2025-04-09 NOTE — PROGRESS NOTES
Subjective:      Patient ID: Mini Chappell is a 55 y.o. female.    Chief Complaint: Pain of the Left Knee (Left Knee Pain patient states just ongoing pain about wk ago and causing right knee to hurt. She has tried icing and elevating and Tylenol #3, but no brace. She does have some tenderness but have not  noticed swelling.  )    HPI:   Mini Chappell is a 55 y.o. female who presents today for initial evaluation of her left knee    History of Present Illness    CHIEF COMPLAINT:  - Left knee pain    HPI:  Mini presents for evaluation of left knee pain that started approximately one week ago. Pain is located behind the knee and behind the kneecap. The pain began spontaneously without any apparent trigger. She reports difficulty putting on shoes and overcompensating with her right knee, which had previously received a gel injection with good results.    She has lost 58 lbs since her last visit, attributing this to watching her diet and taking Mounjaro prescribed by her practitioner for pre-diabetes. She denies any recent medication changes beyond the addition of Mounjaro for weight loss and pre-diabetes management. She denies any history of diabetes.    PREVIOUS TREATMENTS:  - Gel injection in the right knee: Provided significant benefit. She reports no problems since receiving this injection.    IMAGING:  - XR Left Knee: Good joint spaces on both the inside and outside, some wear behind the kneecap (patellofemoral arthritis), no fractures noted  - XR Right Knee: Good joint spaces on both the inside and outside, some wear behind the kneecap, similar to the left knee    MEDICATIONS:  - Mounjaro: Prescribed for pre-diabetes and weight loss    ROS:  Musculoskeletal: +joint pain, +limb pain, +pain with movement         Past Medical History:   Diagnosis Date    Anxiety     Bipolar affective     Chronic fatigue     Depression     Fibromyalgia     GERD (gastroesophageal reflux disease)     Migraines     Pulmonary embolism   "   Sleep apnea, unspecified     Unspecified osteoarthritis, unspecified site      Past Surgical History:   Procedure Laterality Date    DILATION AND CURETTAGE OF UTERUS      gastric sleeve      Heart monitor loop      HYSTERECTOMY      TUBAL LIGATION       Social History[1]    Current Medications[2]  Review of patient's allergies indicates:   Allergen Reactions    Erythromycin base Diarrhea and Nausea And Vomiting     Stomach cramps    Macrolide antibiotics Diarrhea, Rash and Nausea And Vomiting     Other reaction(s): diarrhea/vomiting    Makes her sick     Makes her sick    Makes her sick    Other reaction(s): diarrhea/vomiting   Makes her sick    Makes her sick    Stomach cramps       /76 (BP Location: Left arm, Patient Position: Sitting)   Pulse 67   Ht 5' 7" (1.702 m)   Wt 102.5 kg (226 lb)   BMI 35.40 kg/m²     Comprehensive review of systems completed and negative except as per HPI.        Objective:   Head: Normocephalic, without obvious abnormality, atraumatic  Eyes: conjunctivae/corneas clear. EOM's intact  Ears: normal external appearance  Nose: Nares normal. Septum midline. Mucosa normal. No drainage  Throat: normal findings: lips normal without lesions  Lungs: unlabored breathing on room air  Chest wall: symmetric chest rise  Heart: regular rate and rhythm  Pulses: 2+ and symmetric  Skin: Skin color, texture, turgor normal. No rashes or lesions  Neurologic: Grossly normal    left KNEE:     Alignment: neutral  Appearance: skin is intact without lesion  Effusion: none  Gait: antalgic  Straight Leg Raise: negative  Log Roll: negative  Hip ROM: full and painless  Ankle ROM: full and painless   Knee ROM:  0 - 125  Tenderness: TTP mild medial joint line  Patellar Mobility: normal  Patellar grind: negative  PF Crepitus: negative        Vignesh Test: negative   Valgus Stress @ 0 deg: stable  Valgus Stress @ 30 deg: stable  Varus Stress @ 0 deg: stable  Varus Stress @ 30 deg: stable  Lachman: " stable  Ant Drawer: negative   Post Drawer: negative  Posterior Sag Sign: negative  Neurological deficits: SILT dp/sp/t distributions  Motor: 5/5 EHL/FHL/TA/GS    Warm well perfused lower extremity with capillary refill less than 2 seconds and sensation intact to light touch in the terminal nerve distributions. Calf soft and easily compressible without clinical sign of DVT. No palpable popliteal lymphadenopathy    Assessment:     Imaging:   Four view weight bearing radiographs of the left knee obtained today personally reviewed showing no acute fractures or dislocations. Joint spaces are well maintained with mild to moderate PF degenerative changes. No gross malalignment.         1. Left knee pain, unspecified chronicity          Plan:       Orders Placed This Encounter    Large Joint Aspiration/Injection: L knee    X-Ray Knee Complete 4 or More Views Left        Imaging and exam findings discussed.     Assessment & Plan    PROCEDURES:  - # Procedures  - Left knee steroid injection administered today.  - Discussed potential future gel injection if steroid injection does not provide long-lasting relief.    PATIENT INSTRUCTIONS:  - Continue low impact exercises.  - Avoid activities that cause discomfort.       RICE. OTC meds as needed and medically able.     All questions were answered. Patient happy and in agreement with the plan.     This note was generated with the assistance of ambient listening technology. Verbal consent was obtained by the patient and accompanying visitor(s) for the recording of patient appointment to facilitate this note. I attest to having reviewed and edited the generated note for accuracy, though some syntax or spelling errors may persist. Please contact the author of this note for any clarification.            [1]   Social History  Socioeconomic History    Marital status:    Tobacco Use    Smoking status: Former     Current packs/day: 0.00     Average packs/day: 0.5 packs/day for  12.0 years (6.0 ttl pk-yrs)     Types: Cigarettes     Start date: 11/3/2008     Quit date: 11/3/2020     Years since quittin.4    Smokeless tobacco: Never    Tobacco comments:     Patient smokes medical marijuana   Substance and Sexual Activity    Alcohol use: Not Currently    Drug use: Yes     Types: Amphetamines, Benzodiazepines, Marijuana     Comment: These are prescriptions    Sexual activity: Not Currently     Partners: Male     Birth control/protection: See Surgical Hx     Social Drivers of Health     Financial Resource Strain: Low Risk  (3/12/2025)    [facility] and Affiliates    Overall Financial Resource Strain (CARDIA)     Difficulty of Paying Living Expenses: Not hard at all   Food Insecurity: No Food Insecurity (3/12/2025)    [facility]    Hunger Vital Sign     Worried About Running Out of Food in the Last Year: Never true     Ran Out of Food in the Last Year: Never true   Transportation Needs: No Transportation Needs (3/12/2025)    [facility]    PRAPARE - Transportation     Lack of Transportation (Medical): No     Lack of Transportation (Non-Medical): No   Physical Activity: Inactive (3/12/2025)    [facility]    Exercise Vital Sign     Days of Exercise per Week: 0 days     Minutes of Exercise per Session: 0 min   Stress: Stress Concern Present (3/12/2025)    Received from [facility] and Aff[facility]    [place] of Occupational Health - Occupational Stress Questionnaire     Feeling of Stress : Very much   Housing Stability: Low Risk  (3/12/2025)    [facility]  [facility] and Its Sub[facility]idiaries and Affiliates    Housing Stability Vital Sign     Unable to Pay for Housing in the Last Year: No     Number of Times Moved in the Last Year: 0     Homeless in the Last Year: No   [2]   Current Outpatient Medications:     acetaminophen (TYLENOL) 650 MG TbSR, Take 650 mg by mouth every 8 (eight) hours as needed., Disp: , Rfl:     acetaminophen-codeine 300-30mg (TYLENOL #3) 300-30 mg Tab, Take 1 tablet by mouth every 6 (six) hours as needed., Disp: , Rfl:     albuterol (PROVENTIL/VENTOLIN HFA) 90 mcg/actuation inhaler, Inhale 2 puffs into the lungs every 6 (six) hours as needed for Wheezing. Rescue, Disp: 18 g, Rfl: 11    ASHWAGANDHA EXTRACT ORAL, Take by mouth., Disp: , Rfl:     atorvastatin (LIPITOR) 40 MG tablet, Take 40 mg by mouth once daily., Disp: , Rfl:     AZSTARYS 39.2 mg- 7.8 mg Cap, Take 1 capsule by mouth Daily., Disp: , Rfl:     amitriptyline (ELAVIL) 75 MG tablet, Take 75 mg by mouth every evening., Disp: , Rfl:     BELSOMRA 10 mg Tab, Take 10 mg by mouth Daily., Disp: , Rfl:     calcium citrate-vitamin D3 315-200 mg (CITRACAL+D) 315 mg-5 mcg (200 unit) per tablet, Take 1 tablet by mouth., Disp: , Rfl:     calcium citrate/vitamin D3 (CITRACAL + D ORAL), Take 630 mg by mouth 2 (two) times a day., Disp: , Rfl:     cholecalciferol, vitamin D3, 1,250 mcg (50,000 unit) Tab, Take 1,250 mcg by mouth., Disp: , Rfl:     COQ10, UBIQUINOL, ORAL, Take 100 mg by mouth Daily., Disp: , Rfl:     cycloSPORINE (RESTASIS) 0.05 % ophthalmic emulsion, 1 drop 2 (two) times daily., Disp: , Rfl:     esomeprazole mag-glycerin 20 mg kcsp, , Disp: , Rfl:     famotidine (PEPCID) 20 MG tablet, Take 20 mg by mouth 2 (two) times daily. (Patient not taking: Reported on 6/11/2024), Disp: , Rfl:     ipratropium (ATROVENT) 42 mcg (0.06 %) nasal spray, 2 sprays by Nasal route 4 (four) times daily., Disp: , Rfl:     levomefolate calcium (ELFOLATE) 15 mg Tab, Take 15 mg by mouth Daily., Disp: , Rfl:      melatonin 10 mg Tab, Take 10 mg by mouth Daily., Disp: , Rfl:     MINIPRESS 1 mg Cap, Take 1 mg by mouth every evening., Disp: , Rfl:     multivitamin with minerals tablet, Take 1 tablet by mouth., Disp: , Rfl:     mupirocin (BACTROBAN) 2 % ointment, Apply topically., Disp: , Rfl:     mv,calcium,min/iron/folic/vitK (ONE-A-DAY WOMEN'S COMPLETE ORAL), Take by mouth Daily., Disp: , Rfl:     nystatin (MYCOSTATIN) cream, Apply topically., Disp: , Rfl:     pantoprazole (PROTONIX) 40 MG tablet, Take 40 mg by mouth once daily., Disp: , Rfl:     sucralfate (CARAFATE) 1 gram tablet, Take 1 g by mouth 4 (four) times daily as needed., Disp: , Rfl:     tiZANidine (ZANAFLEX) 4 MG tablet, Take 4 mg by mouth Daily., Disp: , Rfl:     tolterodine (DETROL LA) 4 MG 24 hr capsule, Take 4 mg by mouth once daily., Disp: , Rfl:     triamcinolone acetonide 0.1% (KENALOG) 0.1 % cream, Apply topically once as needed., Disp: , Rfl:     TRINTELLIX 20 mg Tab, Take 20 mg by mouth nightly. (Patient not taking: Reported on 6/11/2024), Disp: , Rfl:     UNABLE TO FIND, Inhale into the lungs every 6 (six) hours. medication name: Prescription Cannabis, Disp: , Rfl:     UNABLE TO FIND, medication name: [place] Vitamin D3 1 capsule 50,000intl units oral daily, Disp: , Rfl:     valACYclovir (VALTREX) 500 MG tablet, Take 500 mg by mouth 2 (two) times daily. 1 tab po bid for 3 days prn, Disp: , Rfl:     vitamin D (VITAMIN D3) 1000 units Tab, Take 1,000 Units by mouth once a week., Disp: , Rfl:     VRAYLAR 3 mg Cap, Take 3 mg by mouth Daily., Disp: , Rfl:     XARELTO 10 mg Tab, Take 10 mg by mouth Daily., Disp: , Rfl:

## 2025-04-09 NOTE — PROCEDURES
Large Joint Aspiration/Injection: L knee    Date/Time: 4/9/2025 9:00 AM    Performed by: Radha Dee PA-C  Authorized by: Emmanuel Escalera MD    Consent Done?:  Yes (Verbal)  Indications:  Arthritis and pain  Local anesthesia used?: No      Details:  Needle Size:  22 G  Ultrasonic Guidance for needle placement?: No    Approach:  Anterolateral  Location:  Knee  Site:  L knee  Medications:  2 mL LIDOcaine HCL 10 mg/ml (1%) 10 mg/mL (1 %); 12 mg betamethasone acetate-betamethasone sodium phosphate 6 mg/mL